# Patient Record
Sex: FEMALE | Race: WHITE | Employment: FULL TIME | URBAN - METROPOLITAN AREA
[De-identification: names, ages, dates, MRNs, and addresses within clinical notes are randomized per-mention and may not be internally consistent; named-entity substitution may affect disease eponyms.]

---

## 2018-05-18 ENCOUNTER — TRANSFERRED RECORDS (OUTPATIENT)
Dept: HEALTH INFORMATION MANAGEMENT | Facility: CLINIC | Age: 63
End: 2018-05-18

## 2018-07-19 ENCOUNTER — TRANSFERRED RECORDS (OUTPATIENT)
Dept: HEALTH INFORMATION MANAGEMENT | Facility: CLINIC | Age: 63
End: 2018-07-19

## 2018-07-25 ENCOUNTER — TRANSFERRED RECORDS (OUTPATIENT)
Dept: HEALTH INFORMATION MANAGEMENT | Facility: CLINIC | Age: 63
End: 2018-07-25

## 2018-07-26 ENCOUNTER — MEDICAL CORRESPONDENCE (OUTPATIENT)
Dept: HEALTH INFORMATION MANAGEMENT | Facility: CLINIC | Age: 63
End: 2018-07-26

## 2018-07-26 ENCOUNTER — PRE VISIT (OUTPATIENT)
Dept: ONCOLOGY | Facility: CLINIC | Age: 63
End: 2018-07-26

## 2018-07-26 NOTE — TELEPHONE ENCOUNTER
Date of appointment: 8/27/18    Diagnosis/reason for appointment: 2nd opinion, family history of Ovarian CA, previous endometriosis.  Referring provider/facility: Dr. Lauren Ribeiro/[facility]  Who called: Patient    Recent Studies   Imaging:  Pathology:  Labs:  Previous chemo/radiation (if known):    Records requested/received from: [facility]    Additional information:

## 2018-07-30 PROCEDURE — 00000346 ZZHCL STATISTIC REVIEW OUTSIDE SLIDES TC 88321: Performed by: OBSTETRICS & GYNECOLOGY

## 2018-07-30 NOTE — TELEPHONE ENCOUNTER
7/30/18 Received slides and 1 pathology report from [facility] and sent to [facility] path dept.

## 2018-07-31 ENCOUNTER — TRANSFERRED RECORDS (OUTPATIENT)
Dept: HEALTH INFORMATION MANAGEMENT | Facility: CLINIC | Age: 63
End: 2018-07-31

## 2018-08-01 LAB — COPATH REPORT: NORMAL

## 2018-08-02 NOTE — TELEPHONE ENCOUNTER
8/2/18 Received records from [facility] (8 pages and 23 pages) and sent to scanning via [employer] @ 1:54 and 1:56 PM

## 2018-08-27 ENCOUNTER — ONCOLOGY VISIT (OUTPATIENT)
Dept: ONCOLOGY | Facility: CLINIC | Age: 63
End: 2018-08-27
Attending: OBSTETRICS & GYNECOLOGY
Payer: COMMERCIAL

## 2018-08-27 ENCOUNTER — RADIANT APPOINTMENT (OUTPATIENT)
Dept: CT IMAGING | Facility: CLINIC | Age: 63
End: 2018-08-27
Attending: OBSTETRICS & GYNECOLOGY
Payer: COMMERCIAL

## 2018-08-27 VITALS
SYSTOLIC BLOOD PRESSURE: 164 MMHG | DIASTOLIC BLOOD PRESSURE: 85 MMHG | WEIGHT: 102.7 LBS | HEART RATE: 67 BPM | OXYGEN SATURATION: 100 % | RESPIRATION RATE: 16 BRPM | HEIGHT: 60 IN | BODY MASS INDEX: 20.16 KG/M2

## 2018-08-27 DIAGNOSIS — Z80.41 FAMILY HISTORY OF MALIGNANT NEOPLASM OF OVARY: ICD-10-CM

## 2018-08-27 DIAGNOSIS — N80.9 ENDOMETRIOSIS: ICD-10-CM

## 2018-08-27 DIAGNOSIS — N80.9 ENDOMETRIOSIS: Primary | ICD-10-CM

## 2018-08-27 PROCEDURE — 99205 OFFICE O/P NEW HI 60 MIN: CPT | Mod: ZP | Performed by: OBSTETRICS & GYNECOLOGY

## 2018-08-27 PROCEDURE — G0463 HOSPITAL OUTPT CLINIC VISIT: HCPCS | Mod: ZF

## 2018-08-27 RX ORDER — IOPAMIDOL 755 MG/ML
62 INJECTION, SOLUTION INTRAVASCULAR ONCE
Status: COMPLETED | OUTPATIENT
Start: 2018-08-27 | End: 2018-08-27

## 2018-08-27 RX ORDER — LISINOPRIL 20 MG/1
20 TABLET ORAL EVERY MORNING
COMMUNITY
Start: 2018-06-06

## 2018-08-27 RX ORDER — CEPHRADINE 500 MG
200 CAPSULE ORAL EVERY MORNING
COMMUNITY

## 2018-08-27 RX ADMIN — IOPAMIDOL 62 ML: 755 INJECTION, SOLUTION INTRAVASCULAR at 12:15

## 2018-08-27 ASSESSMENT — PAIN SCALES - GENERAL: PAINLEVEL: NO PAIN (0)

## 2018-08-27 NOTE — LETTER
2018       RE: Dayana Luna  [address] [place] [address]     Dear Colleague,    Thank you for referring your patient, Dayana Luna, to the [facility]ER [facility]. Please see a copy of my visit note below.                            Consult Notes on Referred Patient    Date: 2018       Dr. Lauren Ribeiro, DO  [facility]  [address]  [place] [address]       RE: Dayana Luna  : 1955  TAHIR: 2018    Dear Dr. Lauren Ribeiro:    I had the pleasure of seeing your patient Dayana Luna here at the Gynecologic Cancer Clinic at the [facility] on 2018.  As you know she is a very pleasant 63 year old woman with a recent diagnosis of family history of ovarian cancer.  Given these findings she was subsequently sent to the Gynecologic Cancer Clinic for new patient consultation.   G1, P1, one prior  section.  She went through menopause at age 48.  She has never been on hormone replacement therapy.  No vaginal bleeding.  Both her sisters had been diagnosed with clear cell ovarian cancer, one sister about 2 years ago with a stage IC clear cell carcinoma of the ovaries and undergone adjuvant chemotherapy.  The second sister has been treated here by us with stage IIB clear cell carcinoma of the ovary.  She has undergone, also, adjuvant chemotherapy.  Both also had endometriosis.  Because of that, the patient has recently undergone an attempted laparoscopic hysterectomy due to adhesions.  Only a right salpingo-oophorectomy was performed.  She did have significant endometriosis.  The right tube and ovary were benign.   preoperatively was 8.  There was no ultrasound preoperatively given the patient's discomfort from transvaginal ultrasound.  She now has recovered well from surgery.  She has no nausea or vomiting, fever or chills.  Normal urinary and bowel function.  No vaginal bleeding.  No B symptoms.             Past Medical  "History:  Hypertension.           Past Surgical History:  1.  Low transverse  section.   2.  Laparoscopy for endometriosis ablation.   3.  Laparoscopic right salpingo-oophorectomy.           Health Maintenance:  Health Maintenance Due   Topic Date Due     PHQ-2 Q1 YR  1965     TETANUS IMMUNIZATION (SYSTEM ASSIGNED)  1971     HIV SCREEN (SYSTEM ASSIGNED)  1971     HEPATITIS C SCREENING  1971     PAP SCREENING Q3 YR (SYSTEM ASSIGNED)  1974     LIPID SCREEN Q5 YR FEMALE (SYSTEM ASSIGNED)  1998     MAMMO SCREEN Q2 YR (SYSTEM ASSIGNED)  2003     COLON CANCER SCREEN (SYSTEM ASSIGNED)  2003     ADVANCE DIRECTIVE PLANNING Q5 YRS  2008     PNEUMOCOCCAL (1 of 2 - PCV13) 2018     FALL RISK ASSESSMENT  2018     DEXA SCAN SCREENING (SYSTEM ASSIGNED)  2018     No history of abnormal Pap smears.             Current Medications:     has a current medication list which includes the following prescription(s): iohexol, alpha-lipoic acid, aspirin, lisinopril, and ubiquinol.       Allergies:     [unfilled]        Social History:     Social History   Substance Use Topics     Smoking status: Never Smoker     Smokeless tobacco: Never Used     Alcohol use Yes       History   Drug Use No           Family History:   Clear cell carcinoma of 2 sisters in their 60s of the ovary.           Physical Exam:     /85  Pulse 67  Resp 16  Ht 1.53 m (5' 0.24\")  Wt 46.6 kg (102 lb 11.2 oz)  LMP   SpO2 100%  BMI 19.9 kg/m2  Body mass index is 19.9 kg/(m^2).    General Appearance: healthy and alert, no distress     Musculoskeletal: extremities non tender and without edema    Neurological: normal gait, no gross defects     Psychiatric: appropriate mood and affect                               ABDOMEN:  Soft, nontender, nondistended, no organomegaly.   PELVIC:  Normal external genitalia.  Normal vaginal mucosa.  Normal-appearing cervix.  No adnexal masses or " tenderness.  Rectovaginal confirms.             Assessment:    Dayana Luna is a 63 year old woman with a new diagnosis of family history of ovarian cancer.     A total of 60 minutes was spent with the patient, 40 minutes of which were spent in counseling the patient and/or treatment planning.    1.  Family history of ovarian cancer.   2.  Endometriosis.      Discussed with the patient we will obtain CT of the chest, abdomen and pelvis to evaluate for any anatomic abnormalities.  She had a preoperative CA-125.  It is normal.  I will not repeat this again, as she had recent surgery.  I will see her back after that and will also proceed with at least a left salpingo-oophorectomy.  Given the significance of disease she has, she might even need a hysterectomy at that point, depending on the findings of the CT.  We will have her see my colleagues in Anesthesia at the same time for preoperative clearance.  They do agree with this plan, are very appreciative of her care.  We did discuss that if there is a cancer that typically will run in families.  Sister was tested with 25-gene panel which was negative for germline.  Other sister is still waiting for her testing.  She is part of [facility] program with testing for several hundred genes.  However, we did discuss even if those are negative, which most likely they will be, she is at significant risk just given her family history and the fact that she has endometriosis, which would warrant a prophylactic left salpingo-oophorectomy plus/minus hysterectomy regardless.  Agrees with this plan, is very appreciative of her care.  All questions were answered.             Thank you for allowing us to participate in the care of your patient.         Sincerely,    Jose Hassan MD, MS    Department of Obstetrics and Gynecology   Division of Gynecologic Oncology   [facility]  Phone: [phone]      CC  Patient Care Team:  Yogesh  Eva MIRAMONTES as PCP - General (Family Practice)  Lauren Ribeiro DO as Referring Physician

## 2018-08-27 NOTE — MR AVS SNAPSHOT
"              After Visit Summary   8/27/2018    Dayana Luna    MRN: [id number]           Patient Information     Date Of Birth          1955        Visit Information        Provider Department      8/27/2018 9:00 AM Jose Hassan MD [facility]        Today's Diagnoses     Endometriosis    -  1    Family history of malignant neoplasm of ovary           Follow-ups after your visit        Who to contact     If you have questions or need follow up information about today's clinic visit or your schedule please contact [facility] directly at [phone].  Normal or non-critical lab and imaging results will be communicated to you by MyChart, letter or phone within 4 business days after the clinic has received the results. If you do not hear from us within 7 days, please contact the clinic through MyChart or phone. If you have a critical or abnormal lab result, we will notify you by phone as soon as possible.  Submit refill requests through [employer] or call your pharmacy and they will forward the refill request to us. Please allow 3 business days for your refill to be completed.          Additional Information About Your Visit        Care EveryWhere ID     This is your Care EveryWhere ID. This could be used by other organizations to access your [facility] medical records  [id number]        Your Vitals Were     Pulse Respirations Height Pulse Oximetry BMI (Body Mass Index)       67 16 1.53 m (5' 0.24\") 100% 19.9 kg/m2        Blood Pressure from Last 3 Encounters:   08/27/18 164/85    Weight from Last 3 Encounters:   08/27/18 46.6 kg (102 lb 11.2 oz)              Today, you had the following     No orders found for display         Today's Medication Changes          These changes are accurate as of 8/27/18 11:59 PM.  If you have any questions, ask your nurse or doctor.               Start taking these medicines.        Dose/Directions    iohexol 140 MG/ML Soln solution "   Commonly known as:  OMNIPAQUE   Used for:  Endometriosis, Family history of malignant neoplasm of ovary   Started by:  Jose Hassan MD        Mix entire bottle (50ml) of contast with 600ml (20 ounces) of water and drink half 2 hrs prior to CT scan and half 1 hr prior to scan   Quantity:  50 mL   Refills:  0            Where to get your medicines      These medications were sent to [facility] [address]  [address] [address]    Hours:  TRANSPLANT PHONE NUMBER [phone] Phone:  [phone]     iohexol 140 MG/ML Soln solution                Primary Care Provider Office Phone # Fax #    Eva Zuñiga [phone]       [facility] [address] [address]        Equal Access to Services     FLORENTINO SAL : Hadii hsannan richardson hadasho Soomaali, waaxda luqadaha, qaybta kaalmada adeegyada, alvarez jones haycatherine moreno . So [facility] [phone].    ATENCIÓN: Si habla español, tiene a berger disposición servicios gratuitos de asistencia lingüística. Llame al [phone].    We comply with applicable federal civil rights laws and [place] laws. We do not discriminate on the basis of race, color, national origin, age, disability, sex, sexual orientation, or gender identity.            Thank you!     Thank you for choosing [facility]CER CLINIC  for your care. Our goal is always to provide you with excellent care. Hearing back from our patients is one way we can continue to improve our services. Please take a few minutes to complete the written survey that you may receive in the mail after your visit with us. Thank you!             Your Updated Medication List - Protect others around you: Learn how to safely use, store and throw away your medicines at www.disposemymeds.org.          This list is accurate as of 8/27/18 11:59 PM.  Always use your most recent med list.                    Brand Name Dispense Instructions for use Diagnosis    Alpha-Lipoic Acid 200 MG Caps      Take 200 mg by mouth        ASPIRIN 81 PO      Take 81 mg by mouth        iohexol 140 MG/ML Soln solution    OMNIPAQUE    50 mL    Mix entire bottle (50ml) of contast with 600ml (20 ounces) of water and drink half 2 hrs prior to CT scan and half 1 hr prior to scan    Endometriosis, Family history of malignant neoplasm of ovary       lisinopril 20 MG tablet    PRINIVIL/ZESTRIL          Ubiquinol 200 MG Caps      Take 1 capsule by mouth

## 2018-08-27 NOTE — DISCHARGE INSTRUCTIONS

## 2018-09-11 DIAGNOSIS — N83.209 OVARIAN CYST: Primary | ICD-10-CM

## 2018-09-11 DIAGNOSIS — R93.5 ABNORMAL CT SCAN, PELVIS: ICD-10-CM

## 2018-09-13 ENCOUNTER — CARE COORDINATION (OUTPATIENT)
Dept: ONCOLOGY | Facility: CLINIC | Age: 63
End: 2018-09-13

## 2018-09-13 NOTE — PROGRESS NOTES
"Patient reached out to RN to discuss result and plan.     Patient also wanted to update RN that she was diagnosed with a inner ear infection and was placed on a \"steroid pack\".     Patient agrees to the plan discussed and would like to schedule her MRI on her own as to assure a time that works.     Patient appreciative of the RN's time.    Mariah Duval RN    "

## 2018-09-29 ENCOUNTER — RADIANT APPOINTMENT (OUTPATIENT)
Dept: MRI IMAGING | Facility: CLINIC | Age: 63
End: 2018-09-29
Attending: OBSTETRICS & GYNECOLOGY
Payer: COMMERCIAL

## 2018-09-29 DIAGNOSIS — R93.5 ABNORMAL CT SCAN, PELVIS: ICD-10-CM

## 2018-09-29 DIAGNOSIS — N83.209 OVARIAN CYST: ICD-10-CM

## 2018-09-29 RX ORDER — GADOBUTROL 604.72 MG/ML
7.5 INJECTION INTRAVENOUS ONCE
Status: COMPLETED | OUTPATIENT
Start: 2018-09-29 | End: 2018-09-29

## 2018-09-29 RX ADMIN — GADOBUTROL 5 ML: 604.72 INJECTION INTRAVENOUS at 12:44

## 2018-09-29 NOTE — DISCHARGE INSTRUCTIONS
MRI Contrast Discharge Instructions    The IV contrast you received today will pass out of your body in your  urine. This will happen in the next 24 hours. You will not feel this process.  Your urine will not change color.    Drink at least 4 extra glasses of water or juice today (unless your doctor  has restricted your fluids). This reduces the stress on your kidneys.  You may take your regular medicines.    If you are on dialysis: It is best to have dialysis today.    If you have a reaction: Most reactions happen right away. If you have  any new symptoms after leaving the hospital (such as hives or swelling),  call your hospital at the correct number below. Or call your family doctor.  If you have breathing distress or wheezing, call 911.    Special instructions: ***    I have read and understand the above information.    Signature:______________________________________ Date:___________    Staff:__________________________________________ Date:___________     Time:__________    [place] [facility] Departments:    ___[place]: [phone]  ___[facility]: [phone]  ___[place]: [phone] ___[facility]: [phone]  ___[facility]: [phone]  ___[facility]: [phone]  ___[phone]  ___[facility]: [phone]  ___[phone]

## 2018-10-05 DIAGNOSIS — Z01.818 PREOPERATIVE EXAMINATION: Primary | ICD-10-CM

## 2018-10-24 ENCOUNTER — OFFICE VISIT (OUTPATIENT)
Dept: SURGERY | Facility: CLINIC | Age: 63
End: 2018-10-24
Payer: COMMERCIAL

## 2018-10-24 ENCOUNTER — ANESTHESIA EVENT (OUTPATIENT)
Dept: SURGERY | Facility: CLINIC | Age: 63
End: 2018-10-24

## 2018-10-24 ENCOUNTER — ONCOLOGY VISIT (OUTPATIENT)
Dept: ONCOLOGY | Facility: CLINIC | Age: 63
End: 2018-10-24
Attending: OBSTETRICS & GYNECOLOGY
Payer: COMMERCIAL

## 2018-10-24 VITALS
TEMPERATURE: 98 F | DIASTOLIC BLOOD PRESSURE: 80 MMHG | WEIGHT: 101.8 LBS | OXYGEN SATURATION: 99 % | SYSTOLIC BLOOD PRESSURE: 150 MMHG | HEART RATE: 73 BPM | BODY MASS INDEX: 19.73 KG/M2

## 2018-10-24 VITALS
RESPIRATION RATE: 16 BRPM | HEIGHT: 60 IN | WEIGHT: 104.1 LBS | OXYGEN SATURATION: 99 % | SYSTOLIC BLOOD PRESSURE: 170 MMHG | DIASTOLIC BLOOD PRESSURE: 78 MMHG | BODY MASS INDEX: 20.44 KG/M2 | HEART RATE: 61 BPM | TEMPERATURE: 98 F

## 2018-10-24 DIAGNOSIS — Z80.41 FAMILY HISTORY OF MALIGNANT NEOPLASM OF OVARY: ICD-10-CM

## 2018-10-24 DIAGNOSIS — Z01.818 PRE-OP EXAMINATION: Primary | ICD-10-CM

## 2018-10-24 DIAGNOSIS — Z80.41 FAMILY HISTORY OF MALIGNANT NEOPLASM OF OVARY: Primary | ICD-10-CM

## 2018-10-24 DIAGNOSIS — N80.9 ENDOMETRIOSIS: ICD-10-CM

## 2018-10-24 LAB
ANION GAP SERPL CALCULATED.3IONS-SCNC: 5 MMOL/L (ref 3–14)
BUN SERPL-MCNC: 13 MG/DL (ref 7–30)
CALCIUM SERPL-MCNC: 9.3 MG/DL (ref 8.5–10.1)
CHLORIDE SERPL-SCNC: 106 MMOL/L (ref 94–109)
CO2 SERPL-SCNC: 30 MMOL/L (ref 20–32)
CREAT SERPL-MCNC: 0.7 MG/DL (ref 0.52–1.04)
ERYTHROCYTE [DISTWIDTH] IN BLOOD BY AUTOMATED COUNT: 11.7 % (ref 10–15)
GFR SERPL CREATININE-BSD FRML MDRD: 84 ML/MIN/1.7M2
GLUCOSE SERPL-MCNC: 87 MG/DL (ref 70–99)
HCT VFR BLD AUTO: 40.8 % (ref 35–47)
HGB BLD-MCNC: 13.6 G/DL (ref 11.7–15.7)
MCH RBC QN AUTO: 31.1 PG (ref 26.5–33)
MCHC RBC AUTO-ENTMCNC: 33.3 G/DL (ref 31.5–36.5)
MCV RBC AUTO: 93 FL (ref 78–100)
PLATELET # BLD AUTO: 239 10E9/L (ref 150–450)
POTASSIUM SERPL-SCNC: 3.6 MMOL/L (ref 3.4–5.3)
RBC # BLD AUTO: 4.37 10E12/L (ref 3.8–5.2)
SODIUM SERPL-SCNC: 141 MMOL/L (ref 133–144)
WBC # BLD AUTO: 4.2 10E9/L (ref 4–11)

## 2018-10-24 PROCEDURE — 99214 OFFICE O/P EST MOD 30 MIN: CPT | Mod: 57 | Performed by: OBSTETRICS & GYNECOLOGY

## 2018-10-24 PROCEDURE — 86900 BLOOD TYPING SEROLOGIC ABO: CPT

## 2018-10-24 PROCEDURE — G0463 HOSPITAL OUTPT CLINIC VISIT: HCPCS | Mod: ZF

## 2018-10-24 PROCEDURE — 86901 BLOOD TYPING SEROLOGIC RH(D): CPT

## 2018-10-24 PROCEDURE — 86850 RBC ANTIBODY SCREEN: CPT

## 2018-10-24 RX ORDER — PHENAZOPYRIDINE HYDROCHLORIDE 200 MG/1
200 TABLET, FILM COATED ORAL ONCE
Status: CANCELLED | OUTPATIENT
Start: 2018-10-24 | End: 2018-10-24

## 2018-10-24 RX ORDER — CHOLECALCIFEROL (VITAMIN D3) 25 MCG
1 TABLET,CHEWABLE ORAL EVERY MORNING
COMMUNITY

## 2018-10-24 RX ORDER — CEFAZOLIN SODIUM 2 G/50ML
2 SOLUTION INTRAVENOUS
Status: CANCELLED | OUTPATIENT
Start: 2018-10-24

## 2018-10-24 RX ORDER — CEFAZOLIN SODIUM 1 G/50ML
1 INJECTION, SOLUTION INTRAVENOUS SEE ADMIN INSTRUCTIONS
Status: CANCELLED | OUTPATIENT
Start: 2018-10-24

## 2018-10-24 RX ORDER — LEVOTHYROXINE SODIUM 88 UG/1
88 TABLET ORAL AT BEDTIME
COMMUNITY
Start: 2018-08-21

## 2018-10-24 RX ORDER — HEPARIN SODIUM 5000 [USP'U]/.5ML
5000 INJECTION, SOLUTION INTRAVENOUS; SUBCUTANEOUS
Status: CANCELLED | OUTPATIENT
Start: 2018-10-24

## 2018-10-24 ASSESSMENT — PAIN SCALES - GENERAL: PAINLEVEL: NO PAIN (0)

## 2018-10-24 NOTE — MR AVS SNAPSHOT
After Visit Summary   10/24/2018    Dayana Luna    MRN: [id number]           Patient Information     Date Of Birth          1955        Visit Information        Provider Department      10/24/2018 12:30 PM Maureen Jorge, APRN CNP [facility]operative [facility]        Care Instructions    Preparing for Your Surgery      Name:  Dayana Luna   MRN:  [id number]   :  1955   Today's Date:  10/24/2018     Arriving for surgery:  Surgery date:  Surgery has not yet been scheduled.  An RN from the Pre Admission Nursing Department                              Will call you 1-2 days before your procedure to confirm arrival time, location and fasting instructions   Arrival time:    Please come to:     To Be Determined     What can I eat or drink?  -  You may have solid food or milk products until 8 hours prior to your surgery.  -  You may have water, apple juice or 7up/Sprite until 2 hours prior to your surgery.    Which medicines can I take?        Stop Aspirin, vitamins and supplements one week prior to surgery.      Hold Ibuprofen and Naproxen for 24 hours prior to surgery.     -  Do NOT take these medications in the morning, the day of surgery:     Lisinopril       -  Please take these medications the day of surgery:     Levothyroxine       How do I prepare myself?  -  Take two showers: one the night before surgery; and one the morning of surgery.         Use Scrubcare or Hibiclens to wash from neck down.  You may use your own     shampoo and conditioner. No other hair products.   -  Do NOT use lotion, powder, deodorant, or antiperspirant the day of your surgery.  -  Do NOT wear any makeup, fingernail polish or jewelry  - Do not bring your own medications to the hospital, except for inhalers and eye   drops.  -  Bring your ID and insurance card.    Questions or Concerns:  -If you have questions or concerns regarding the day of surgery, please call [phone].     -If you are  scheduled at the Ambulatory Surgery Center please call [phone].    -For questions after surgery please call your surgeons office.                     Follow-ups after your visit        Your next 10 appointments already scheduled     Oct 24, 2018 12:30 PM CDT   (Arrive by 12:15 PM)   PAC EVALUATION with CAROLINE Daniel CNP   [facility]operative [facility] ([facility])    [address]  [address]  [facility] [address] [phone]              Future tests that were ordered for you today     Open Future Orders        Priority Expected Expires Ordered    ABO/Rh Type and Screen Routine  10/24/2019 10/24/2018            Who to contact     Please call your clinic at [phone] to:    Ask questions about your health    Make or cancel appointments    Discuss your medicines    Learn about your test results    Speak to your doctor            Additional Information About Your Visit        Care EveryWhere ID     This is your Care EveryWhere ID. This could be used by other organizations to access your [facility] medical records  [id number]         Blood Pressure from Last 3 Encounters:   10/24/18 150/80   08/27/18 164/85    Weight from Last 3 Encounters:   10/24/18 46.2 kg (101 lb 12.8 oz)   08/27/18 46.6 kg (102 lb 11.2 oz)              Today, you had the following     No orders found for display       Primary Care Provider Office Phone # Fax #    Eva Zuñiga [phone]       [facility] [address] [address]        Equal Access to Services     FLORENTINO SAL AH: Hadii shannan zavalao Soelizabeth, waaxda luqadaha, qaybta kaalmada pedro luisegyada, alvarez kahn. So [facility] [phone].    ATENCIÓN: Si habla español, tiene a berger disposición servicios gratuitos de asistencia lingüística. Llame al [phone].    We comply with applicable federal civil rights laws and [place] laws. We do not discriminate  on the basis of race, color, national origin, age, disability, sex, sexual orientation, or gender identity.            Thank you!     Thank you for choosing [facility] PREOPERATIVE ASSESSMENT CENTER  for your care. Our goal is always to provide you with excellent care. Hearing back from our patients is one way we can continue to improve our services. Please take a few minutes to complete the written survey that you may receive in the mail after your visit with us. Thank you!             Your Updated Medication List - Protect others around you: Learn how to safely use, store and throw away your medicines at www.disposemymeds.org.          This list is accurate as of 10/24/18 12:22 PM.  Always use your most recent med list.                   Brand Name Dispense Instructions for use Diagnosis    Alpha-Lipoic Acid 200 MG Caps      Take 200 mg by mouth        ASPIRIN 81 PO      Take 81 mg by mouth        FISH OIL PO      Take 1,400 mg by mouth        levothyroxine 88 MCG tablet    SYNTHROID/LEVOTHROID     Take 88 mcg by mouth        lisinopril 20 MG tablet    PRINIVIL/ZESTRIL          Lysine 500 MG Caps      Take 1,000 mg by mouth        OCUVITE ADULT 50+ PO      Take 1 tablet by mouth        STRESS B-COMPLEX/VIT C/ZINC Tabs      Take 1 tablet by mouth        Ubiquinol 200 MG Caps      Take 1 capsule by mouth

## 2018-10-24 NOTE — NURSING NOTE
"Oncology Rooming Note    October 24, 2018 10:45 AM   Dayana Luna is a 63 year old female who presents for:    Chief Complaint   Patient presents with     Oncology Clinic Visit     Return; Family Hx Ovarian CA and Prev Dx of Endometriosis     Initial Vitals: /80  Pulse 73  Temp 98  F (36.7  C) (Oral)  Wt 46.2 kg (101 lb 12.8 oz)  SpO2 99%  BMI 19.73 kg/m2 Estimated body mass index is 19.73 kg/(m^2) as calculated from the following:    Height as of 8/27/18: 1.53 m (5' 0.24\").    Weight as of this encounter: 46.2 kg (101 lb 12.8 oz). Body surface area is 1.4 meters squared.  No Pain (0) Comment: Data Unavailable   No LMP recorded. Patient has had a hysterectomy.  Allergies reviewed: Yes  Medications reviewed: Yes    Medications: Medication refills not needed today.  Pharmacy name entered into EPIC: Data Unavailable    Clinical concerns: Here to discuss surgery options. Sonya was notified.    7 minutes for nursing intake (face to face time)     Trish Bess MA              "

## 2018-10-24 NOTE — LETTER
10/24/2018       RE: Dayana Luna  [address] [place] [address]     Dear Colleague,    Thank you for referring your patient, Dayana Luna, to the [facility] CANCER CLINIC. Please see a copy of my visit note below.                Follow Up Notes on Referred Patient    Date: 10/24/2018       Dr. Lauren Ribeiro, DO  [facility]  [address]  [place] [address]       RE: Dayana Luna  : 1955  TAHIR: 10/24/2018    Dear Dr. Lauren Ribeiro:    Dayana Luna is a 63 year old woman with a diagnosis of family history of ovarian cancer.           Patient presents for followup.  No new symptoms since the last time I have seen her.           Past Medical History:    Past Medical History:   Diagnosis Date     Endometriosis      Hypertension      Hypothyroid      Labyrinthitis, acute      Osteopenia          Past Surgical History:    Past Surgical History:   Procedure Laterality Date     SALPINGO OOPHORECTOMY,R/L/MANGO Right 2018         Health Maintenance Due   Topic Date Due     PHQ-2 Q1 YR  1965     TETANUS IMMUNIZATION (SYSTEM ASSIGNED)  1971     HIV SCREEN (SYSTEM ASSIGNED)  1971     HEPATITIS C SCREENING  1971     LIPID SCREEN Q5 YR FEMALE (SYSTEM ASSIGNED)  1998     MAMMO SCREEN Q2 YR (SYSTEM ASSIGNED)  2003     COLON CANCER SCREEN (SYSTEM ASSIGNED)  2003     ADVANCE DIRECTIVE PLANNING Q5 YRS  2008     DEXA SCAN SCREENING (SYSTEM ASSIGNED)  2018       Current Medications:     Current Outpatient Prescriptions   Medication Sig Dispense Refill     Alpha-Lipoic Acid 200 MG CAPS Take 200 mg by mouth every morning        B Complex-C-E-Zn (STRESS B-COMPLEX/VIT C/ZINC) TABS Take 1 tablet by mouth every morning        levothyroxine (SYNTHROID/LEVOTHROID) 88 MCG tablet Take 88 mcg by mouth At Bedtime        lisinopril (PRINIVIL/ZESTRIL) 20 MG tablet Take 20 mg by mouth every morning        Lysine 500 MG CAPS Take 500  mg by mouth every morning        Multiple Vitamins-Minerals (OCUVITE ADULT 50+ PO) Take 1 tablet by mouth every morning        Omega-3 Fatty Acids (FISH OIL PO) Take 1,400 mg by mouth every morning        Ubiquinol 200 MG CAPS Take 1 capsule by mouth every morning        ASPIRIN 81 PO Take 81 mg by mouth every morning ON HOLD FOR SURGERY SINCE 09/24/2018       Calcium Carb-Cholecalciferol (CALCIUM 600 + D PO) Take 600 mg by mouth 2 times daily           Allergies:        Allergies   Allergen Reactions     Codeine Hives     Polymyxin B-Trimethoprim Itching and Swelling        Social History:     Social History   Substance Use Topics     Smoking status: Never Smoker     Smokeless tobacco: Never Used     Alcohol use Yes       History   Drug Use No         Family History:   Patient's sister has had a stage I clear cell carcinoma of the ovary.           Physical Exam:     /80  Pulse 73  Temp 98  F (36.7  C) (Oral)  Wt 46.2 kg (101 lb 12.8 oz)  SpO2 99%  BMI 19.73 kg/m2  Body mass index is 19.73 kg/(m^2).    General Appearance: healthy and alert, no distress     Neurological: normal gait, no gross defects     Psychiatric: appropriate mood and affect                                     Assessment:    Dayana Luna is a 63 year old woman with a diagnosis of family history of ovarian cancer.     A total of 25 minutes was spent with the patient, 20 minutes of which were spent in counseling the patient and/or treatment planning.      1.  History of ovarian cancer.   2.  Prior history of endometriosis.   3.  Normal CA-125.      Reviewed with the patient as well as with her family the scans.  There is no evidence of disease.  We will proceed with a risk-reducing removal of her remaining ovary and tube.  Depending on intraoperative findings, would also proceed with a hysterectomy to inquire cancer or precancer and then possible debulking as well as staging procedure.  The patient agrees with this plan, is very  appreciative of her care.  We discussed we will try to do this laparoscopically; however, with removal of the other tube and ovary had significant difficulties with adhesions.  If that is a significant issue, we might have to do this with an open approach.  The patient agrees with the plan, is very appreciative of her care.  All questions were answered.  I will have her see my colleagues in Anesthesia for preoperative clearance.  All questions were answered.       Jose Hassan MD, MS    Department of Obstetrics and Gynecology   Division of Gynecologic Oncology   [facility]  Phone: [phone]        CC  Patient Care Team:  Eva Zuñiga as PCP - General (Family Practice)  Lauren Ribeiro DO as Referring Physician

## 2018-10-24 NOTE — ANESTHESIA PREPROCEDURE EVALUATION
Anesthesia Evaluation     . Pt has had prior anesthetic. Type: General and MAC    No history of anesthetic complications          ROS/MED HX    ENT/Pulmonary: Comment: Seen early September in the ED for vertigo.  Found to be an inner abnormality.  Seen by ENT.  Since has cleared up.       Neurologic:  - neg neurologic ROS     Cardiovascular:     (+) hypertension----. : . . . :. . No previous cardiac testing       METS/Exercise Tolerance: Comment: Walks for 10 minutes at everyone of her breaks at work.  Kettle bell workout.  >4 METS   Hematologic:  - neg hematologic  ROS       Musculoskeletal:  - neg musculoskeletal ROS       GI/Hepatic:  - neg GI/hepatic ROS       Renal/Genitourinary:  - ROS Renal section negative       Endo: Comment: Known Hashimoto Syndrome being followed.     (+) thyroid problem hypothyroidism, .      Psychiatric:  - neg psychiatric ROS       Infectious Disease:  - neg infectious disease ROS       Malignancy:      - no malignancy   Other:    (+) No chance of pregnancy C-spine cleared: Yes, no H/O Chronic Pain,no other significant disability                    Physical Exam  Normal systems: cardiovascular, pulmonary and dental    Airway   Mallampati: I  TM distance: >3 FB  Neck ROM: full    Dental   (+) missing    Cardiovascular   Rhythm and rate: regular and normal      Pulmonary    breath sounds clear to auscultation    Other findings:     EKG from OSH 9/6/18:  NSR     For further details of assessment, testing, and physical exam please see H and P completed on same date.           PAC Discussion and Assessment    ASA Classification: 1  Case is suitable for: [place]  Anesthetic techniques and relevant risks discussed:   Invasive monitoring and risk discussed:   Types:   Possibility and Risk of blood transfusion discussed:   NPO instructions given:   Additional anesthetic preparation and risks discussed:   Needs early admission to pre-op area:   Other:     PAC Resident/NP Anesthesia  Assessment:  Dayana Luna is a 63 year old female scheduled for left salpingo-oophorectomy with possible hysterectomy with Dr. Hassan with date, time, and location to be determined.  Ms. Luna saw Dr. Hassan on 8/27/2018 for a h/o endometriosis and a strong family history of ovarian cancer (2 sisters with clear cell ovarian cancer). Ms. Luna is status post laparoscopic right salpingo-oophorectomy as well as status post laparoscopic endometrial ablation.  Through the evaluation and imaging, Dr. Hassan recommended the above surgery.    Ms. Luna presents to PAC with her , Dioni.  She denies any cardiopulmonary history or symptoms.  She remains active easily achieving 4 METS. She denies any issues with anesthesia.  She would like to proceed with above surgical intervention.    She has the following specific operative considerations:   - METS:  >4. RCRI : No serious cardiac risks.  0.4 % risk of major adverse cardiac event.  - ADAM # of risks 2/8 = low risk  - VTE risk:  0.5-3%  - Risk of PONV score = 2-3.  If > 2, anti-emetic intervention recommended.    1.  Cardiology: denies cardiopulmonary history or symptoms.  HTN, hold ACE-I DOS. ASA for primary prevention, hold for 7 days prior to surgery.      2.  Pulmonary - no smoking hx  3.  Endocrine - hypothyroidism, followed by endocrine.  Take levothyroxine as prescribed prior to surgery.        - osteopenia, Calcium ad Vit D replacement.  .   4.  Gyn - endometriosis and strong family history of ovarian cancer (2 sisters with clear cell ovarian cancer)>>>above procedure planned.     - Anesthesia considerations:  Refer to PAC assessment in anesthesia records  - CBC, BMP, T& S today     Arrival time, NPO, shower and medication instructions provided by nursing staff today.  Preparing For Your Surgery handout given.  Patient was discussed with Dr Mcdaniel. I spent 30 minutes face to face with patient assessing, educating, counseling and/or coordinating care and  examining the patient.  Of that 30 minutes, I spent greater than 50% of my time counseling and/or coordinating care.        Reviewed and Signed by PAC Mid-Level Provider/Resident  Mid-Level Provider/Resident: Maureen VELAZQUEZ CNP  Date: 10/24/2018  Time: 13:07    Attending Anesthesiologist Anesthesia Assessment:  63 year old for left SO in management of potential for ovarian cancer. Prior right SO and endometrial ablation. Patient has no significant cardiac or pulmonary disease.    Patient/case discussed with JAMAL/resident; agree with above assessment. No need to see patient. Patient is appropriate for the planned procedure without further work-up or medical management.      Reviewed and Signed by PAC Anesthesiologist  Anesthesiologist: deepali  Date: 10.24/2018  Time:   Pass/Fail: Pass  Disposition:     PAC Pharmacist Assessment:        Pharmacist:   Date:   Time:                           .

## 2018-10-24 NOTE — PROGRESS NOTES
Follow Up Notes on Referred Patient    Date: 10/24/2018       Dr. Lauren Ribeiro, DO  [facility]  [address]  [place] [address]       RE: Dayana Luna  : 1955  TAHIR: 10/24/2018    Dear Dr. Lauren Ribeiro:    Dayana Luna is a 63 year old woman with a diagnosis of family history of ovarian cancer.           Patient presents for followup.  No new symptoms since the last time I have seen her.           Past Medical History:    Past Medical History:   Diagnosis Date     Endometriosis      Hypertension      Hypothyroid      Labyrinthitis, acute      Osteopenia          Past Surgical History:    Past Surgical History:   Procedure Laterality Date     SALPINGO OOPHORECTOMY,R/L/MANGO Right 2018         Health Maintenance Due   Topic Date Due     PHQ-2 Q1 YR  1965     TETANUS IMMUNIZATION (SYSTEM ASSIGNED)  1971     HIV SCREEN (SYSTEM ASSIGNED)  1971     HEPATITIS C SCREENING  1971     LIPID SCREEN Q5 YR FEMALE (SYSTEM ASSIGNED)  1998     MAMMO SCREEN Q2 YR (SYSTEM ASSIGNED)  2003     COLON CANCER SCREEN (SYSTEM ASSIGNED)  2003     ADVANCE DIRECTIVE PLANNING Q5 YRS  2008     DEXA SCAN SCREENING (SYSTEM ASSIGNED)  2018       Current Medications:     Current Outpatient Prescriptions   Medication Sig Dispense Refill     Alpha-Lipoic Acid 200 MG CAPS Take 200 mg by mouth every morning        B Complex-C-E-Zn (STRESS B-COMPLEX/VIT C/ZINC) TABS Take 1 tablet by mouth every morning        levothyroxine (SYNTHROID/LEVOTHROID) 88 MCG tablet Take 88 mcg by mouth At Bedtime        lisinopril (PRINIVIL/ZESTRIL) 20 MG tablet Take 20 mg by mouth every morning        Lysine 500 MG CAPS Take 500 mg by mouth every morning        Multiple Vitamins-Minerals (OCUVITE ADULT 50+ PO) Take 1 tablet by mouth every morning        Omega-3 Fatty Acids (FISH OIL PO) Take 1,400 mg by mouth every morning        Ubiquinol 200 MG CAPS Take 1 capsule  by mouth every morning        ASPIRIN 81 PO Take 81 mg by mouth every morning ON HOLD FOR SURGERY SINCE 09/24/2018       Calcium Carb-Cholecalciferol (CALCIUM 600 + D PO) Take 600 mg by mouth 2 times daily           Allergies:        Allergies   Allergen Reactions     Codeine Hives     Polymyxin B-Trimethoprim Itching and Swelling        Social History:     Social History   Substance Use Topics     Smoking status: Never Smoker     Smokeless tobacco: Never Used     Alcohol use Yes       History   Drug Use No         Family History:   Patient's sister has had a stage I clear cell carcinoma of the ovary.           Physical Exam:     /80  Pulse 73  Temp 98  F (36.7  C) (Oral)  Wt 46.2 kg (101 lb 12.8 oz)  SpO2 99%  BMI 19.73 kg/m2  Body mass index is 19.73 kg/(m^2).    General Appearance: healthy and alert, no distress     Neurological: normal gait, no gross defects     Psychiatric: appropriate mood and affect                                     Assessment:    Dayana Luna is a 63 year old woman with a diagnosis of family history of ovarian cancer.     A total of 25 minutes was spent with the patient, 20 minutes of which were spent in counseling the patient and/or treatment planning.      1.  Family History of ovarian cancer.   2.  Prior history of endometriosis.   3.  Normal CA-125.      Reviewed with the patient as well as with her family the scans.  There is no evidence of disease.  We will proceed with a risk-reducing removal of her remaining ovary and tube.  Depending on intraoperative findings, would also proceed with a hysterectomy if we find cancer or precancer and then possible debulking as well as staging procedure.  The patient agrees with this plan, is very appreciative of her care.  We discussed we will try to do this laparoscopically; however, with removal of the other tube and ovary had significant difficulties with adhesions.  If that is a significant issue, we might have to do this with an  open approach.  The patient agrees with the plan, is very appreciative of her care.  All questions were answered.  I will have her see my colleagues in Anesthesia for preoperative clearance.  All questions were answered.       Jose Hassan MD, MS    Department of Obstetrics and Gynecology   Division of Gynecologic Oncology   [facility]  Phone: [phone]        CC  Patient Care Team:  Eva Zuñiga as PCP - General (Family Practice)  Nacho Ribeiro, DO as Referring Physician  NACHO RIBEIRO

## 2018-10-24 NOTE — MR AVS SNAPSHOT
After Visit Summary   10/24/2018    Dayana Luna    MRN: [id number]           Patient Information     Date Of Birth          1955        Visit Information        Provider Department      10/24/2018 10:20 AM Jose Hassan MD [facility]        Today's Diagnoses     Family history of malignant neoplasm of ovary    -  1       Follow-ups after your visit        Who to contact     If you have questions or need follow up information about today's clinic visit or your schedule please contact [facility] directly at [phone].  Normal or non-critical lab and imaging results will be communicated to you by MyChart, letter or phone within 4 business days after the clinic has received the results. If you do not hear from us within 7 days, please contact the clinic through MyChart or phone. If you have a critical or abnormal lab result, we will notify you by phone as soon as possible.  Submit refill requests through [employer] or call your pharmacy and they will forward the refill request to us. Please allow 3 business days for your refill to be completed.          Additional Information About Your Visit        Care EveryWhere ID     This is your Care EveryWhere ID. This could be used by other organizations to access your [facility] medical records  [id number]        Your Vitals Were     Pulse Temperature Pulse Oximetry BMI (Body Mass Index)          73 98  F (36.7  C) (Oral) 99% 19.73 kg/m2         Blood Pressure from Last 3 Encounters:   10/24/18 170/78   10/24/18 150/80   08/27/18 164/85    Weight from Last 3 Encounters:   10/24/18 47.2 kg (104 lb 1.6 oz)   10/24/18 46.2 kg (101 lb 12.8 oz)   08/27/18 46.6 kg (102 lb 11.2 oz)              We Performed the Following     [place]-Operative Worksheet - Laparoscopic bilateral Salpingo-Oophorectomy, possible open/laparotomy, possible cancer staging        Primary Care Provider Office Phone # Fax #    Eva Zuñiga  [phone]       [facility] [address] [address]        Equal Access to Services     FLORENTINO SAL : Hadii aad ku haddayneyahaira Li, katiaconnor hewittsamha, laura orvillemichelle pedro luisbacilio, waxvivian darrylin hayaawhit cloudsamia paniagua lajaradwhit kahn. So [facility] [phone].    ATENCIÓN: Si habla español, tiene a berger disposición servicios gratuitos de asistencia lingüística. Llame al [phone].    We comply with applicable federal civil rights laws and [place] laws. We do not discriminate on the basis of race, color, national origin, age, disability, sex, sexual orientation, or gender identity.            Thank you!     Thank you for choosing [facility]  for your care. Our goal is always to provide you with excellent care. Hearing back from our patients is one way we can continue to improve our services. Please take a few minutes to complete the written survey that you may receive in the mail after your visit with us. Thank you!             Your Updated Medication List - Protect others around you: Learn how to safely use, store and throw away your medicines at www.disposemymeds.org.          This list is accurate as of 10/24/18 11:59 PM.  Always use your most recent med list.                   Brand Name Dispense Instructions for use Diagnosis    Alpha-Lipoic Acid 200 MG Caps      Take 200 mg by mouth every morning        ASPIRIN 81 PO      Take 81 mg by mouth every morning ON HOLD FOR SURGERY SINCE 09/24/2018        CALCIUM 600 + D PO      Take 600 mg by mouth 2 times daily        FISH OIL PO      Take 1,400 mg by mouth every morning        levothyroxine 88 MCG tablet    SYNTHROID/LEVOTHROID     Take 88 mcg by mouth At Bedtime        lisinopril 20 MG tablet    PRINIVIL/ZESTRIL     Take 20 mg by mouth every morning        Lysine 500 MG Caps      Take 500 mg by mouth every morning        OCUVITE ADULT 50+ PO      Take 1 tablet by mouth every morning        STRESS B-COMPLEX/VIT  C/ZINC Tabs      Take 1 tablet by mouth every morning        Ubiquinol 200 MG Caps      Take 1 capsule by mouth every morning

## 2018-10-24 NOTE — H&P
Pre-Operative H & P     CC:  Preoperative exam to assess for increased cardiopulmonary risk while undergoing surgery and anesthesia.    Date of Encounter: 10/24/2018  Primary Care Physician:  Eva Zuñiga  Reason for visit:   Endometriosis    HPI  Dayana Luna is a 63 year old female who presents for pre-operative H & P in preparation for left salpingo-oophorectomy with possible hysterectomy with Dr. Hassan with date, time, and location to be determined.  Ms. Luna saw Dr. Hassan on 8/27/2018 for a h/o endometriosis and a strong family history of ovarian cancer (2 sisters with clear cell ovarian cancer). Ms. Luna is status post laparoscopic right salpingo-oophorectomy as well as status post laparoscopic endometrial ablation.  Through the evaluation and imaging, Dr. Hassan recommended the above surgery.    Ms. Luna presents to PAC with her , Dioni.  She denies any cardiopulmonary history or symptoms.  She remains active easily achieving 4 METS. She denies any issues with anesthesia.  She would like to proceed with above surgical intervention.     History is obtained from the patient and EMR.     Past Medical History  Past Medical History:   Diagnosis Date     Endometriosis      Hypertension      Hypothyroid      Labyrinthitis, acute      Osteopenia        Past Surgical History  Past Surgical History:   Procedure Laterality Date     SALPINGO OOPHORECTOMY,R/L/MANGO Right 07/24/2018       Hx of Blood transfusions/reactions: denies     Hx of abnormal bleeding or anti-platelet use: ASA    Menstrual history: No LMP recorded. Patient has had a hysterectomy.:    Steroid use in the last year: denies    Personal or FH with difficulty with Anesthesia:  denies    Prior to Admission Medications  Current Outpatient Prescriptions   Medication Sig Dispense Refill     Alpha-Lipoic Acid 200 MG CAPS Take 200 mg by mouth every morning        ASPIRIN 81 PO Take 81 mg by mouth every morning ON HOLD FOR SURGERY  SINCE 09/24/2018       B Complex-C-E-Zn (STRESS B-COMPLEX/VIT C/ZINC) TABS Take 1 tablet by mouth every morning        Calcium Carb-Cholecalciferol (CALCIUM 600 + D PO) Take 600 mg by mouth 2 times daily       levothyroxine (SYNTHROID/LEVOTHROID) 88 MCG tablet Take 88 mcg by mouth At Bedtime        lisinopril (PRINIVIL/ZESTRIL) 20 MG tablet Take 20 mg by mouth every morning        Lysine 500 MG CAPS Take 500 mg by mouth every morning        Multiple Vitamins-Minerals (OCUVITE ADULT 50+ PO) Take 1 tablet by mouth every morning        Omega-3 Fatty Acids (FISH OIL PO) Take 1,400 mg by mouth every morning        Ubiquinol 200 MG CAPS Take 1 capsule by mouth every morning          Allergies  Allergies   Allergen Reactions     Codeine Hives     Polymyxin B-Trimethoprim Itching and Swelling       Social History  Social History     Social History     Marital status:      Spouse name: Dioni     Number of children: 1     Years of education: N/A     Occupational History     Not on file.     Social History Main Topics     Smoking status: Never Smoker     Smokeless tobacco: Never Used     Alcohol use Yes     Drug use: No     Sexual activity: Not on file     Other Topics Concern     Not on file     Social History Narrative     to Dioni.  Works full time.  Lives in own home.  Remains active.  Adult daughter with granddaughter and grandson.        Family History  History reviewed. No pertinent family history.    ROS/MED HX    ENT/Pulmonary: Comment: Seen early September in the ED for vertigo.  Found to be an inner abnormality.  Seen by ENT.  Since has cleared up.       Neurologic:  - neg neurologic ROS     Cardiovascular:     (+) hypertension----. : . . . :. . No previous cardiac testing       METS/Exercise Tolerance: Comment: Walks for 10 minutes at everyone of her breaks at work.  Kettle bell workout.  >4 METS   Hematologic:  - neg hematologic  ROS       Musculoskeletal:  - neg musculoskeletal ROS       GI/Hepatic:  " - neg GI/hepatic ROS       Renal/Genitourinary:  - ROS Renal section negative       Endo: Comment: Known Hashimoto Syndrome being followed.     (+) thyroid problem hypothyroidism, .      Psychiatric:  - neg psychiatric ROS       Infectious Disease:  - neg infectious disease ROS       Malignancy:      - no malignancy   Other:    (+) No chance of pregnancy C-spine cleared: Yes, no H/O Chronic Pain,no other significant disability          Temp: 98  F (36.7  C) Temp src: Oral BP: 170/78 Pulse: 61   Resp: 16 SpO2: 99 %         104 lbs 1.6 oz  5' 0\"   Body mass index is 20.33 kg/(m^2).       Physical Exam  Constitutional: Awake, alert, cooperative, no apparent distress, and appears stated age.  Eyes: Pupils equal, round and reactive to light, extra ocular muscles intact, sclera clear, conjunctiva normal.  HENT: Normocephalic, oral pharynx with moist mucus membranes, dentition fair repair. No goiter appreciated.   Respiratory: Clear to auscultation bilaterally, no crackles or wheezing.  Cardiovascular: Regular rate and rhythm, normal S1 and S2, and no murmur noted.  Carotids +2, no bruits. No edema. Palpable pulses to radial  DP and PT arteries.   GI: Normal bowel sounds, soft, non-distended, non-tender, no masses palpated, no hepatosplenomegaly.  Surgical scars: well healed laparoscopic incision sites from this summer's surgery.   Lymph/Hematologic: No cervical lymphadenopathy and no supraclavicular lymphadenopathy.  Genitourinary:  deferred  Skin: Warm and dry.  No rashes at anticipated surgical site.   Musculoskeletal: Full ROM of neck. There is no redness, warmth, or swelling of the joints. Gross motor strength is normal.    Neurologic: Awake, alert, oriented to name, place and time. Cranial nerves II-XII are grossly intact. Gait is normal.   Neuropsychiatric: Calm, cooperative. Normal affect.     Labs: (personally reviewed)  Lab Results   Component Value Date    WBC 4.2 10/24/2018     Lab Results   Component Value " Date    RBC 4.37 10/24/2018     Lab Results   Component Value Date    HGB 13.6 10/24/2018     Lab Results   Component Value Date    HCT 40.8 10/24/2018     Lab Results   Component Value Date    MCV 93 10/24/2018     Lab Results   Component Value Date    MCH 31.1 10/24/2018     Lab Results   Component Value Date    MCHC 33.3 10/24/2018     Lab Results   Component Value Date    RDW 11.7 10/24/2018     Lab Results   Component Value Date     10/24/2018       Last Comprehensive Metabolic Panel:  Sodium   Date Value Ref Range Status   10/24/2018 141 133 - 144 mmol/L Final     Potassium   Date Value Ref Range Status   10/24/2018 3.6 3.4 - 5.3 mmol/L Final     Chloride   Date Value Ref Range Status   10/24/2018 106 94 - 109 mmol/L Final     Carbon Dioxide   Date Value Ref Range Status   10/24/2018 30 20 - 32 mmol/L Final     Anion Gap   Date Value Ref Range Status   10/24/2018 5 3 - 14 mmol/L Final     Glucose   Date Value Ref Range Status   10/24/2018 87 70 - 99 mg/dL Final     Urea Nitrogen   Date Value Ref Range Status   10/24/2018 13 7 - 30 mg/dL Final     Creatinine   Date Value Ref Range Status   10/24/2018 0.70 0.52 - 1.04 mg/dL Final     GFR Estimate   Date Value Ref Range Status   10/24/2018 84 >60 mL/min/1.7m2 Final     Comment:     Non  GFR Calc     Calcium   Date Value Ref Range Status   10/24/2018 9.3 8.5 - 10.1 mg/dL Final         Procedure  EKG from OSH 9/6/18:  NSR   Outside records reviewed from: Care Everywhere    ASSESSMENT and PLAN  Dayana Luna is a 63 year old female scheduled to undergo  left salpingo-oophorectomy with possible hysterectomy with Dr. Hassan with date, time, and location to be determined.      She has the following specific operative considerations:   - METS:  >4. RCRI : No serious cardiac risks.  0.4 % risk of major adverse cardiac event.  - ADAM # of risks 2/8 = low risk  - VTE risk:  0.5-3%  - Risk of PONV score = 2-3.  If > 2, anti-emetic intervention  recommended.    1.  Cardiology: denies cardiopulmonary history or symptoms.  HTN, hold ACE-I DOS. ASA for primary prevention, hold for 7 days prior to surgery.      2.  Pulmonary - no smoking hx  3.  Endocrine - hypothyroidism, followed by endocrine.  Take levothyroxine as prescribed prior to surgery.        - osteopenia, Calcium ad Vit D replacement.  .   4.  Gyn - endometriosis and strong family history of ovarian cancer (2 sisters with clear cell ovarian cancer)>>>above procedure planned.     - Anesthesia considerations:  Refer to PAC assessment in anesthesia records  - CBC, BMP, T& S today     Arrival time, NPO, shower and medication instructions provided by nursing staff today.  Preparing For Your Surgery handout given.  Patient was discussed with Dr Mcdaniel. I spent 30 minutes face to face with patient assessing, educating, counseling and/or coordinating care and examining the patient.  Of that 30 minutes, I spent greater than 50% of my time counseling and/or coordinating care.        CAROLINE Walker CNP  Preoperative Assessment Center  [facility]  Clinic and Surgery Center  Phone: [phone]  Fax: [phone]

## 2018-10-24 NOTE — NURSING NOTE
Pre Op Nurse Teaching Template    Relevant Diagnosis: Family history of ovarian cancer     Teaching Topic: Laparoscopic left salpingo oophorectomy,  Possible open, possible hysterectomy, possible cancer staging, possible debulking     Person(s) involved in teaching :  Patient  & spouse  Motivation Level:  Asks Questions:    Yes      Eager to Learn:     Yes     Cooperative:          Yes    Receptive (willing. Able to accept information):    Yes      Patient and those who are listed above demonstrates understanding of the following:   Reason for the appointment, diagnosis and treatment plan:   Yes   Knowledge of proper use of medications and conditions for which they are ordered (with special attention to potential side effects or drug interactions): Yes   Which situations necessitate calling provider and whom to contact: Yes         Nutritional needs and diet plan:  Yes      Pain management techniques:     Yes, Pain Scale   Diet:   Yes, Health system Diet Instructions    Teaching Concerns addressed: Yes    Infection Prevention:  Patient and those who are listed above demonstrate understanding of the following:  Pre-Op CHG Bathing Instructions: Yes  Surgical procedure site care taught:   Yes   Signs and symptoms of infection taught: Yes       Instructional Materials Used/Given:  The [facility] Before You Surgery Booklet  Showering or Bathing before Surgery Instructions & CHG Product  Hysterectomy Guidelines  Pain Assessment Tool   Home Care after Major Abdominal or Vaginal Surgery  Map  Accommodations Brochure  Phone numbers for Health system and Station 7C  Copy of Surgical Consent    Comments:  ARIELA Duval RN

## 2018-10-24 NOTE — PATIENT INSTRUCTIONS
Preparing for Your Surgery      Name:  Dayana Luna   MRN:  [id number]   :  1955   Today's Date:  10/24/2018     Arriving for surgery:  Surgery date:  Surgery has not yet been scheduled.  An RN from the Pre Admission Nursing Department                              Will call you 1-2 days before your procedure to confirm arrival time, location and fasting instructions   Arrival time:    Please come to:     To Be Determined     What can I eat or drink?  -  You may have solid food or milk products until 8 hours prior to your surgery.  -  You may have water, apple juice or 7up/Sprite until 2 hours prior to your surgery.    Which medicines can I take?        Stop Aspirin, vitamins and supplements one week prior to surgery.      Hold Ibuprofen and Naproxen for 24 hours prior to surgery.     -  Do NOT take these medications in the morning, the day of surgery:     Lisinopril       -  Please take these medications the day of surgery:     Levothyroxine       How do I prepare myself?  -  Take two showers: one the night before surgery; and one the morning of surgery.         Use Scrubcare or Hibiclens to wash from neck down.  You may use your own     shampoo and conditioner. No other hair products.   -  Do NOT use lotion, powder, deodorant, or antiperspirant the day of your surgery.  -  Do NOT wear any makeup, fingernail polish or jewelry  - Do not bring your own medications to the hospital, except for inhalers and eye   drops.  -  Bring your ID and insurance card.    Questions or Concerns:  -If you have questions or concerns regarding the day of surgery, please call [phone].     -If you are scheduled at the [facility] please call [phone].    -For questions after surgery please call your surgeons office.

## 2018-10-25 LAB
ABO + RH BLD: NORMAL
ABO + RH BLD: NORMAL
BLD GP AB SCN SERPL QL: NORMAL
BLOOD BANK CMNT PATIENT-IMP: NORMAL
BLOOD BANK CMNT PATIENT-IMP: NORMAL
SPECIMEN EXP DATE BLD: NORMAL

## 2018-11-01 ENCOUNTER — TELEPHONE (OUTPATIENT)
Dept: ONCOLOGY | Facility: CLINIC | Age: 63
End: 2018-11-01

## 2018-11-07 ENCOUNTER — TELEPHONE (OUTPATIENT)
Dept: ONCOLOGY | Facility: CLINIC | Age: 63
End: 2018-11-07

## 2019-01-14 ENCOUNTER — ANESTHESIA EVENT (OUTPATIENT)
Dept: SURGERY | Facility: CLINIC | Age: 64
End: 2019-01-14
Payer: COMMERCIAL

## 2019-01-15 ENCOUNTER — ANESTHESIA (OUTPATIENT)
Dept: SURGERY | Facility: CLINIC | Age: 64
End: 2019-01-15
Payer: COMMERCIAL

## 2019-01-15 ENCOUNTER — SURGERY (OUTPATIENT)
Age: 64
End: 2019-01-15
Payer: COMMERCIAL

## 2019-01-15 ENCOUNTER — HOSPITAL ENCOUNTER (OUTPATIENT)
Facility: CLINIC | Age: 64
Discharge: HOME | End: 2019-01-15
Attending: OBSTETRICS & GYNECOLOGY | Admitting: OBSTETRICS & GYNECOLOGY
Payer: COMMERCIAL

## 2019-01-15 VITALS
OXYGEN SATURATION: 100 % | WEIGHT: 105.16 LBS | HEART RATE: 71 BPM | TEMPERATURE: 97.1 F | HEIGHT: 61 IN | BODY MASS INDEX: 19.85 KG/M2 | SYSTOLIC BLOOD PRESSURE: 156 MMHG | RESPIRATION RATE: 16 BRPM | DIASTOLIC BLOOD PRESSURE: 90 MMHG

## 2019-01-15 DIAGNOSIS — Z90.721 STATUS POST LEFT OOPHORECTOMY: Primary | ICD-10-CM

## 2019-01-15 DIAGNOSIS — Z80.41 FAMILY HISTORY OF MALIGNANT NEOPLASM OF OVARY: ICD-10-CM

## 2019-01-15 LAB
ABO + RH BLD: NORMAL
ABO + RH BLD: NORMAL
BLD GP AB SCN SERPL QL: NORMAL
BLOOD BANK CMNT PATIENT-IMP: NORMAL
GLUCOSE BLDC GLUCOMTR-MCNC: 94 MG/DL (ref 70–99)
SPECIMEN EXP DATE BLD: NORMAL

## 2019-01-15 PROCEDURE — 36000059 ZZH SURGERY LEVEL 3 EA 15 ADDTL MIN UMMC: Performed by: OBSTETRICS & GYNECOLOGY

## 2019-01-15 PROCEDURE — 88305 TISSUE EXAM BY PATHOLOGIST: CPT | Performed by: OBSTETRICS & GYNECOLOGY

## 2019-01-15 PROCEDURE — 71000027 ZZH RECOVERY PHASE 2 EACH 15 MINS: Performed by: OBSTETRICS & GYNECOLOGY

## 2019-01-15 PROCEDURE — 25000128 H RX IP 250 OP 636: Performed by: OBSTETRICS & GYNECOLOGY

## 2019-01-15 PROCEDURE — 86900 BLOOD TYPING SEROLOGIC ABO: CPT | Performed by: ANESTHESIOLOGY

## 2019-01-15 PROCEDURE — 82962 GLUCOSE BLOOD TEST: CPT

## 2019-01-15 PROCEDURE — 36415 COLL VENOUS BLD VENIPUNCTURE: CPT | Performed by: ANESTHESIOLOGY

## 2019-01-15 PROCEDURE — 25000125 ZZHC RX 250: Performed by: NURSE ANESTHETIST, CERTIFIED REGISTERED

## 2019-01-15 PROCEDURE — 40000170 ZZH STATISTIC PRE-PROCEDURE ASSESSMENT II: Performed by: OBSTETRICS & GYNECOLOGY

## 2019-01-15 PROCEDURE — 88331 PATH CONSLTJ SURG 1 BLK 1SPC: CPT | Performed by: OBSTETRICS & GYNECOLOGY

## 2019-01-15 PROCEDURE — 86850 RBC ANTIBODY SCREEN: CPT | Performed by: ANESTHESIOLOGY

## 2019-01-15 PROCEDURE — 00000155 ZZHCL STATISTIC H-CELL BLOCK W/STAIN: Performed by: OBSTETRICS & GYNECOLOGY

## 2019-01-15 PROCEDURE — 25000132 ZZH RX MED GY IP 250 OP 250 PS 637: Performed by: STUDENT IN AN ORGANIZED HEALTH CARE EDUCATION/TRAINING PROGRAM

## 2019-01-15 PROCEDURE — 88307 TISSUE EXAM BY PATHOLOGIST: CPT | Performed by: OBSTETRICS & GYNECOLOGY

## 2019-01-15 PROCEDURE — 36000057 ZZH SURGERY LEVEL 3 1ST 30 MIN - UMMC: Performed by: OBSTETRICS & GYNECOLOGY

## 2019-01-15 PROCEDURE — 25000128 H RX IP 250 OP 636: Performed by: NURSE ANESTHETIST, CERTIFIED REGISTERED

## 2019-01-15 PROCEDURE — 25000132 ZZH RX MED GY IP 250 OP 250 PS 637: Performed by: ANESTHESIOLOGY

## 2019-01-15 PROCEDURE — 37000008 ZZH ANESTHESIA TECHNICAL FEE, 1ST 30 MIN: Performed by: OBSTETRICS & GYNECOLOGY

## 2019-01-15 PROCEDURE — 25000566 ZZH SEVOFLURANE, EA 15 MIN: Performed by: OBSTETRICS & GYNECOLOGY

## 2019-01-15 PROCEDURE — 71000014 ZZH RECOVERY PHASE 1 LEVEL 2 FIRST HR: Performed by: OBSTETRICS & GYNECOLOGY

## 2019-01-15 PROCEDURE — 37000009 ZZH ANESTHESIA TECHNICAL FEE, EACH ADDTL 15 MIN: Performed by: OBSTETRICS & GYNECOLOGY

## 2019-01-15 PROCEDURE — 86901 BLOOD TYPING SEROLOGIC RH(D): CPT | Performed by: ANESTHESIOLOGY

## 2019-01-15 PROCEDURE — 88112 CYTOPATH CELL ENHANCE TECH: CPT | Performed by: OBSTETRICS & GYNECOLOGY

## 2019-01-15 PROCEDURE — 25000132 ZZH RX MED GY IP 250 OP 250 PS 637: Performed by: OBSTETRICS & GYNECOLOGY

## 2019-01-15 PROCEDURE — 27210794 ZZH OR GENERAL SUPPLY STERILE: Performed by: OBSTETRICS & GYNECOLOGY

## 2019-01-15 PROCEDURE — 58661 LAPAROSCOPY REMOVE ADNEXA: CPT | Mod: GC | Performed by: OBSTETRICS & GYNECOLOGY

## 2019-01-15 PROCEDURE — 25000128 H RX IP 250 OP 636: Performed by: ANESTHESIOLOGY

## 2019-01-15 RX ORDER — CEFAZOLIN SODIUM 1 G/3ML
1 INJECTION, POWDER, FOR SOLUTION INTRAMUSCULAR; INTRAVENOUS SEE ADMIN INSTRUCTIONS
Status: DISCONTINUED | OUTPATIENT
Start: 2019-01-15 | End: 2019-01-15 | Stop reason: HOSPADM

## 2019-01-15 RX ORDER — ONDANSETRON 4 MG/1
4 TABLET, ORALLY DISINTEGRATING ORAL
Status: DISCONTINUED | OUTPATIENT
Start: 2019-01-15 | End: 2019-01-15 | Stop reason: HOSPADM

## 2019-01-15 RX ORDER — LIDOCAINE 40 MG/G
CREAM TOPICAL
Status: DISCONTINUED | OUTPATIENT
Start: 2019-01-15 | End: 2019-01-15 | Stop reason: HOSPADM

## 2019-01-15 RX ORDER — ONDANSETRON 2 MG/ML
4 INJECTION INTRAMUSCULAR; INTRAVENOUS EVERY 30 MIN PRN
Status: DISCONTINUED | OUTPATIENT
Start: 2019-01-15 | End: 2019-01-15 | Stop reason: HOSPADM

## 2019-01-15 RX ORDER — IBUPROFEN 600 MG/1
600 TABLET, FILM COATED ORAL EVERY 6 HOURS PRN
Qty: 30 TABLET | Refills: 0 | Status: SHIPPED | OUTPATIENT
Start: 2019-01-15 | End: 2019-02-14

## 2019-01-15 RX ORDER — AMOXICILLIN 250 MG
1-2 CAPSULE ORAL 2 TIMES DAILY
Qty: 30 TABLET | Refills: 0 | Status: SHIPPED | OUTPATIENT
Start: 2019-01-15 | End: 2019-02-14

## 2019-01-15 RX ORDER — GLYCOPYRROLATE 0.2 MG/ML
INJECTION, SOLUTION INTRAMUSCULAR; INTRAVENOUS PRN
Status: DISCONTINUED | OUTPATIENT
Start: 2019-01-15 | End: 2019-01-15

## 2019-01-15 RX ORDER — FENTANYL CITRATE 50 UG/ML
25-50 INJECTION, SOLUTION INTRAMUSCULAR; INTRAVENOUS
Status: DISCONTINUED | OUTPATIENT
Start: 2019-01-15 | End: 2019-01-15 | Stop reason: HOSPADM

## 2019-01-15 RX ORDER — ACETAMINOPHEN 325 MG/1
650 TABLET ORAL ONCE
Status: COMPLETED | OUTPATIENT
Start: 2019-01-15 | End: 2019-01-15

## 2019-01-15 RX ORDER — ONDANSETRON 4 MG/1
4 TABLET, ORALLY DISINTEGRATING ORAL EVERY 30 MIN PRN
Status: DISCONTINUED | OUTPATIENT
Start: 2019-01-15 | End: 2019-01-15 | Stop reason: HOSPADM

## 2019-01-15 RX ORDER — CEFAZOLIN SODIUM 2 G/100ML
2 INJECTION, SOLUTION INTRAVENOUS
Status: COMPLETED | OUTPATIENT
Start: 2019-01-15 | End: 2019-01-15

## 2019-01-15 RX ORDER — MEPERIDINE HYDROCHLORIDE 50 MG/ML
12.5 INJECTION INTRAMUSCULAR; INTRAVENOUS; SUBCUTANEOUS
Status: DISCONTINUED | OUTPATIENT
Start: 2019-01-15 | End: 2019-01-15 | Stop reason: HOSPADM

## 2019-01-15 RX ORDER — OXYCODONE HYDROCHLORIDE 5 MG/1
5-10 TABLET ORAL EVERY 4 HOURS PRN
Qty: 12 TABLET | Refills: 0 | Status: SHIPPED | OUTPATIENT
Start: 2019-01-15 | End: 2019-01-18

## 2019-01-15 RX ORDER — HEPARIN SODIUM 5000 [USP'U]/.5ML
5000 INJECTION, SOLUTION INTRAVENOUS; SUBCUTANEOUS
Status: COMPLETED | OUTPATIENT
Start: 2019-01-15 | End: 2019-01-15

## 2019-01-15 RX ORDER — DIPHENHYDRAMINE HYDROCHLORIDE 50 MG/ML
INJECTION INTRAMUSCULAR; INTRAVENOUS PRN
Status: DISCONTINUED | OUTPATIENT
Start: 2019-01-15 | End: 2019-01-15

## 2019-01-15 RX ORDER — LIDOCAINE HYDROCHLORIDE 20 MG/ML
INJECTION, SOLUTION INFILTRATION; PERINEURAL PRN
Status: DISCONTINUED | OUTPATIENT
Start: 2019-01-15 | End: 2019-01-15

## 2019-01-15 RX ORDER — LABETALOL HYDROCHLORIDE 5 MG/ML
10 INJECTION, SOLUTION INTRAVENOUS
Status: DISCONTINUED | OUTPATIENT
Start: 2019-01-15 | End: 2019-01-15 | Stop reason: HOSPADM

## 2019-01-15 RX ORDER — OXYCODONE HYDROCHLORIDE 5 MG/1
5 TABLET ORAL
Status: COMPLETED | OUTPATIENT
Start: 2019-01-15 | End: 2019-01-15

## 2019-01-15 RX ORDER — IBUPROFEN 200 MG
600 TABLET ORAL
Status: DISCONTINUED | OUTPATIENT
Start: 2019-01-15 | End: 2019-01-15 | Stop reason: HOSPADM

## 2019-01-15 RX ORDER — OXYCODONE HYDROCHLORIDE 5 MG/1
5 TABLET ORAL EVERY 4 HOURS PRN
Status: DISCONTINUED | OUTPATIENT
Start: 2019-01-15 | End: 2019-01-15 | Stop reason: HOSPADM

## 2019-01-15 RX ORDER — ONDANSETRON 2 MG/ML
INJECTION INTRAMUSCULAR; INTRAVENOUS PRN
Status: DISCONTINUED | OUTPATIENT
Start: 2019-01-15 | End: 2019-01-15

## 2019-01-15 RX ORDER — ACETAMINOPHEN 325 MG/1
650 TABLET ORAL EVERY 4 HOURS PRN
Qty: 30 TABLET | Refills: 0 | Status: SHIPPED | OUTPATIENT
Start: 2019-01-15 | End: 2019-02-14

## 2019-01-15 RX ORDER — EPHEDRINE SULFATE 50 MG/ML
INJECTION, SOLUTION INTRAMUSCULAR; INTRAVENOUS; SUBCUTANEOUS PRN
Status: DISCONTINUED | OUTPATIENT
Start: 2019-01-15 | End: 2019-01-15

## 2019-01-15 RX ORDER — FENTANYL CITRATE 50 UG/ML
INJECTION, SOLUTION INTRAMUSCULAR; INTRAVENOUS PRN
Status: DISCONTINUED | OUTPATIENT
Start: 2019-01-15 | End: 2019-01-15

## 2019-01-15 RX ORDER — PHENAZOPYRIDINE HYDROCHLORIDE 200 MG/1
200 TABLET, FILM COATED ORAL ONCE
Status: COMPLETED | OUTPATIENT
Start: 2019-01-15 | End: 2019-01-15

## 2019-01-15 RX ORDER — NALOXONE HYDROCHLORIDE 0.4 MG/ML
.1-.4 INJECTION, SOLUTION INTRAMUSCULAR; INTRAVENOUS; SUBCUTANEOUS
Status: DISCONTINUED | OUTPATIENT
Start: 2019-01-15 | End: 2019-01-15 | Stop reason: HOSPADM

## 2019-01-15 RX ORDER — PROPOFOL 10 MG/ML
INJECTION, EMULSION INTRAVENOUS PRN
Status: DISCONTINUED | OUTPATIENT
Start: 2019-01-15 | End: 2019-01-15

## 2019-01-15 RX ORDER — SODIUM CHLORIDE, SODIUM LACTATE, POTASSIUM CHLORIDE, CALCIUM CHLORIDE 600; 310; 30; 20 MG/100ML; MG/100ML; MG/100ML; MG/100ML
INJECTION, SOLUTION INTRAVENOUS CONTINUOUS
Status: DISCONTINUED | OUTPATIENT
Start: 2019-01-15 | End: 2019-01-15 | Stop reason: HOSPADM

## 2019-01-15 RX ORDER — HYDRALAZINE HYDROCHLORIDE 20 MG/ML
2.5-5 INJECTION INTRAMUSCULAR; INTRAVENOUS EVERY 10 MIN PRN
Status: DISCONTINUED | OUTPATIENT
Start: 2019-01-15 | End: 2019-01-15 | Stop reason: HOSPADM

## 2019-01-15 RX ORDER — DEXAMETHASONE SODIUM PHOSPHATE 4 MG/ML
INJECTION, SOLUTION INTRA-ARTICULAR; INTRALESIONAL; INTRAMUSCULAR; INTRAVENOUS; SOFT TISSUE PRN
Status: DISCONTINUED | OUTPATIENT
Start: 2019-01-15 | End: 2019-01-15

## 2019-01-15 RX ORDER — HYDROMORPHONE HYDROCHLORIDE 1 MG/ML
.3-.5 INJECTION, SOLUTION INTRAMUSCULAR; INTRAVENOUS; SUBCUTANEOUS EVERY 10 MIN PRN
Status: DISCONTINUED | OUTPATIENT
Start: 2019-01-15 | End: 2019-01-15 | Stop reason: HOSPADM

## 2019-01-15 RX ADMIN — PHENYLEPHRINE HYDROCHLORIDE 100 MCG: 10 INJECTION, SOLUTION INTRAMUSCULAR; INTRAVENOUS; SUBCUTANEOUS at 08:28

## 2019-01-15 RX ADMIN — SODIUM CHLORIDE, POTASSIUM CHLORIDE, SODIUM LACTATE AND CALCIUM CHLORIDE: 600; 310; 30; 20 INJECTION, SOLUTION INTRAVENOUS at 09:47

## 2019-01-15 RX ADMIN — FENTANYL CITRATE 50 MCG: 50 INJECTION, SOLUTION INTRAMUSCULAR; INTRAVENOUS at 08:58

## 2019-01-15 RX ADMIN — PHENYLEPHRINE HYDROCHLORIDE 100 MCG: 10 INJECTION, SOLUTION INTRAMUSCULAR; INTRAVENOUS; SUBCUTANEOUS at 08:24

## 2019-01-15 RX ADMIN — GLYCOPYRROLATE 0.2 MG: 0.2 INJECTION, SOLUTION INTRAMUSCULAR; INTRAVENOUS at 08:33

## 2019-01-15 RX ADMIN — SUGAMMADEX 100 MG: 100 INJECTION, SOLUTION INTRAVENOUS at 10:19

## 2019-01-15 RX ADMIN — Medication 10 MG: at 08:21

## 2019-01-15 RX ADMIN — OXYCODONE HYDROCHLORIDE 5 MG: 5 TABLET ORAL at 10:59

## 2019-01-15 RX ADMIN — FENTANYL CITRATE 50 MCG: 50 INJECTION, SOLUTION INTRAMUSCULAR; INTRAVENOUS at 09:47

## 2019-01-15 RX ADMIN — CEFAZOLIN SODIUM 2 G: 2 INJECTION, SOLUTION INTRAVENOUS at 08:22

## 2019-01-15 RX ADMIN — Medication 10 MG: at 08:26

## 2019-01-15 RX ADMIN — MIDAZOLAM 2 MG: 1 INJECTION INTRAMUSCULAR; INTRAVENOUS at 08:06

## 2019-01-15 RX ADMIN — PHENAZOPYRIDINE HYDROCHLORIDE 200 MG: 200 TABLET, FILM COATED ORAL at 06:32

## 2019-01-15 RX ADMIN — ROCURONIUM BROMIDE 20 MG: 10 INJECTION INTRAVENOUS at 08:47

## 2019-01-15 RX ADMIN — ROCURONIUM BROMIDE 30 MG: 10 INJECTION INTRAVENOUS at 08:11

## 2019-01-15 RX ADMIN — LIDOCAINE HYDROCHLORIDE 100 MG: 20 INJECTION, SOLUTION INFILTRATION; PERINEURAL at 08:10

## 2019-01-15 RX ADMIN — DEXAMETHASONE SODIUM PHOSPHATE 8 MG: 4 INJECTION, SOLUTION INTRA-ARTICULAR; INTRALESIONAL; INTRAMUSCULAR; INTRAVENOUS; SOFT TISSUE at 08:17

## 2019-01-15 RX ADMIN — ROCURONIUM BROMIDE 20 MG: 10 INJECTION INTRAVENOUS at 09:40

## 2019-01-15 RX ADMIN — HEPARIN SODIUM 5000 UNITS: 1000 INJECTION, SOLUTION INTRAVENOUS; SUBCUTANEOUS at 06:42

## 2019-01-15 RX ADMIN — SODIUM CHLORIDE, POTASSIUM CHLORIDE, SODIUM LACTATE AND CALCIUM CHLORIDE: 600; 310; 30; 20 INJECTION, SOLUTION INTRAVENOUS at 08:08

## 2019-01-15 RX ADMIN — SUGAMMADEX 100 MG: 100 INJECTION, SOLUTION INTRAVENOUS at 10:23

## 2019-01-15 RX ADMIN — ONDANSETRON 4 MG: 2 INJECTION INTRAMUSCULAR; INTRAVENOUS at 10:05

## 2019-01-15 RX ADMIN — ACETAMINOPHEN 650 MG: 325 TABLET, FILM COATED ORAL at 06:32

## 2019-01-15 RX ADMIN — PROPOFOL 80 MG: 10 INJECTION, EMULSION INTRAVENOUS at 08:10

## 2019-01-15 RX ADMIN — FENTANYL CITRATE 100 MCG: 50 INJECTION, SOLUTION INTRAMUSCULAR; INTRAVENOUS at 08:10

## 2019-01-15 RX ADMIN — FENTANYL CITRATE 50 MCG: 50 INJECTION, SOLUTION INTRAMUSCULAR; INTRAVENOUS at 09:06

## 2019-01-15 RX ADMIN — DIPHENHYDRAMINE HYDROCHLORIDE 12.5 MG: 50 INJECTION, SOLUTION INTRAMUSCULAR; INTRAVENOUS at 08:36

## 2019-01-15 ASSESSMENT — MIFFLIN-ST. JEOR: SCORE: 959.38

## 2019-01-15 NOTE — PROGRESS NOTES
"Post-Op Check      Dayana Luna is a 63 year old POD#0 s/p Laparoscopic Left Salpingoophorectomy    S: Pt doing well with pain well controlled at this time, has not required pain medication postop. Denies any nausea, shortness of breath and chest pain. She has voided without issue. Ambulating, tolerating light regular diet.     O:    /76 (BP Location: Right arm)   Pulse 72   Temp 97.1  F (36.2  C) (Oral)   Resp 16   Ht 1.549 m (5' 1\")   Wt 47.7 kg (105 lb 2.6 oz)   SpO2 100%   BMI 19.87 kg/m      No intake/output data recorded.    General:  A&Ox3, NAD  CV:  RRR, no m/r/g  Pulm:  CTAB, good inspiratory effort  Abd: soft, appropriately tender to palpation, nondistended.  No rebound or guarding  Incision: c/d/i  Siddiqi:  Lines:  LE: no edema, no calf tenderness    Labs:  No new labs    A/P:  63 year old F, POD#0 s/p Laparoscopic Left Salpingoophorectomy for risk reduction with family history of clear cell ovarian cancer. History of RSO.   1. FEN: Regular diet   2. Pain: home with acetaminophen, ibuprofen, oxycodone  3. CV: No issues. Currently stable.   4. Pulm: IS. No issues.  5. Heme: Hgb 14.3 >EBL 50 mL. No concern for ongoing bleeding at this time.   6. GI: Regular diet as tolerated, PRN bowel regimen for home   7. : s/p Siddiqi, voiding without issue  8. ID: Currently afebrile. Received routine perioperative prophylaxis.   9. Endocrine: No issues.  10. Psych/Neuro: No issues.    11. Prophylaxis: SCDs, PPI, IS, Lovenox am if Hgb stable.  12. Dispo: routine postoperative cares; d/c home when meeting post operative milestones.    Deirdre Tobar MD  OB/GYN PGY-1  01/15/19 12:17 PM               "

## 2019-01-15 NOTE — ANESTHESIA PREPROCEDURE EVALUATION
Anesthesia Evaluation     . Pt has had prior anesthetic. Type: General and MAC    No history of anesthetic complications          ROS/MED HX    ENT/Pulmonary: Comment: Seen early September in the ED for vertigo.  Found to be an inner abnormality.  Seen by ENT.  Since has cleared up.       Neurologic:  - neg neurologic ROS     Cardiovascular:     (+) hypertension----. : . . . :. . No previous cardiac testing       METS/Exercise Tolerance: Comment: Walks for 10 minutes at everyone of her breaks at work.  Kettle bell workout.  >4 METS   Hematologic:  - neg hematologic  ROS       Musculoskeletal:  - neg musculoskeletal ROS       GI/Hepatic:  - neg GI/hepatic ROS       Renal/Genitourinary:  - ROS Renal section negative       Endo: Comment: Known Hashimoto Syndrome being followed.     (+) thyroid problem hypothyroidism, .      Psychiatric:  - neg psychiatric ROS       Infectious Disease:  - neg infectious disease ROS       Malignancy:      - no malignancy   Other:    (+) No chance of pregnancy C-spine cleared: Yes, no H/O Chronic Pain,no other significant disability                    Physical Exam  Normal systems: cardiovascular, pulmonary and dental    Airway   Mallampati: I  TM distance: >3 FB  Neck ROM: full    Dental   (+) missing    Cardiovascular   Rhythm and rate: regular and normal      Pulmonary    breath sounds clear to auscultation    Other findings:     EKG from OSH 9/6/18:  NSR     For further details of assessment, testing, and physical exam please see H and P completed on same date.           PAC Discussion and Assessment    ASA Classification: 1  Case is suitable for: [place]  Anesthetic techniques and relevant risks discussed:   Invasive monitoring and risk discussed:   Types:   Possibility and Risk of blood transfusion discussed:   NPO instructions given:   Additional anesthetic preparation and risks discussed:   Needs early admission to pre-op area:   Other:     PAC Resident/NP Anesthesia  Assessment:  Dayana Luna is a 63 year old female scheduled for left salpingo-oophorectomy with possible hysterectomy with Dr. Hassan with date, time, and location to be determined.  Ms. Luna saw Dr. Hassan on 8/27/2018 for a h/o endometriosis and a strong family history of ovarian cancer (2 sisters with clear cell ovarian cancer). Ms. Luna is status post laparoscopic right salpingo-oophorectomy as well as status post laparoscopic endometrial ablation.  Through the evaluation and imaging, Dr. Hassan recommended the above surgery.    Ms. Luna presents to PAC with her , Dioni.  She denies any cardiopulmonary history or symptoms.  She remains active easily achieving 4 METS. She denies any issues with anesthesia.  She would like to proceed with above surgical intervention.    She has the following specific operative considerations:   - METS:  >4. RCRI : No serious cardiac risks.  0.4 % risk of major adverse cardiac event.  - ADAM # of risks 2/8 = low risk  - VTE risk:  0.5-3%  - Risk of PONV score = 2-3.  If > 2, anti-emetic intervention recommended.    1.  Cardiology: denies cardiopulmonary history or symptoms.  HTN, hold ACE-I DOS. ASA for primary prevention, hold for 7 days prior to surgery.      2.  Pulmonary - no smoking hx  3.  Endocrine - hypothyroidism, followed by endocrine.  Take levothyroxine as prescribed prior to surgery.        - osteopenia, Calcium ad Vit D replacement.  .   4.  Gyn - endometriosis and strong family history of ovarian cancer (2 sisters with clear cell ovarian cancer)>>>above procedure planned.     - Anesthesia considerations:  Refer to PAC assessment in anesthesia records  - CBC, BMP, T& S today     Arrival time, NPO, shower and medication instructions provided by nursing staff today.  Preparing For Your Surgery handout given.  Patient was discussed with Dr Mcdaniel. I spent 30 minutes face to face with patient assessing, educating, counseling and/or coordinating care and  examining the patient.  Of that 30 minutes, I spent greater than 50% of my time counseling and/or coordinating care.        Reviewed and Signed by PAC Mid-Level Provider/Resident  Mid-Level Provider/Resident: Maureen VELAZQUEZ CNP  Date: 10/24/2018  Time: 13:07    Attending Anesthesiologist Anesthesia Assessment:  63 year old for left SO in management of potential for ovarian cancer. Prior right SO and endometrial ablation. Patient has no significant cardiac or pulmonary disease.    Patient/case discussed with JAMAL/resident; agree with above assessment. No need to see patient. Patient is appropriate for the planned procedure without further work-up or medical management.      Reviewed and Signed by PAC Anesthesiologist  Anesthesiologist: deepali  Date: 10.24/2018  Time:   Pass/Fail: Pass  Disposition:     PAC Pharmacist Assessment:        Pharmacist:   Date:   Time:      Anesthesia Plan      History & Physical Review  History and physical reviewed and following examination; no interval change.    ASA Status:  1 .    NPO Status:  > 8 hours    Plan for General and ETT with Intravenous and Propofol induction. Maintenance will be Balanced.    PONV prophylaxis:  Ondansetron (or other 5HT-3) and Dexamethasone or Solumedrol  Additional equipment: 2nd IV      Postoperative Care  Postoperative pain management:  IV analgesics and Oral pain medications.      Consents  Anesthetic plan, risks, benefits and alternatives discussed with:  Patient.  Use of blood products discussed: Yes.   Use of blood products discussed with Patient.  Consented to blood products.  .                          .    MARY [facility] AN PHYSICAL EXAM    Assessment:   ASA SCORE: 1

## 2019-01-15 NOTE — ANESTHESIA POSTPROCEDURE EVALUATION
Anesthesia POST Procedure Evaluation    Patient: Dayana Luna   MRN:     [id number] Gender:   female   Age:    63 year old :      1955        Preoperative Diagnosis: Family History Of Malignant Neoplasm Of Ovary    Procedure(s):  Laparoscopic Left Salpingo-Oophorectomy, 30 minute lysis of adhesions   Postop Comments: No value filed.       Anesthesia Type:  Not documented    Reportable Event: NO     PAIN: Uncomplicated   Sign Out status: Comfortable, Well controlled pain     PONV: No PONV   Sign Out status:  No Nausea or Vomiting     Neuro/Psych: Uneventful perioperative course   Sign Out Status: Preoperative baseline; Age appropriate mentation     Airway/Resp.: Uneventful perioperative course   Sign Out Status: Non labored breathing, age appropriate RR; Resp. Status within EXPECTED Parameters     CV: Uneventful perioperative course   Sign Out status: Appropriate BP and perfusion indices; Appropriate HR/Rhythm     Disposition:   Sign Out in:  PACU  Disposition:  Phase II; Home  Recovery Course: Uneventful  Follow-Up: Not required           Last Anesthesia Record Vitals:  CRNA VITALS  1/15/2019 0959 - 1/15/2019 1059      1/15/2019             Resp Rate (set):  10          Last PACU/Preop Vitals:  Vitals:    01/15/19 1100 01/15/19 1115 01/15/19 1130   BP: 161/87 169/85    Pulse: 72     Resp: 14 16    Temp: 36.4  C (97.6  F)     SpO2: 98% 99% 99%         Electronically Signed By: Teri Bray MD, January 15, 2019, 11:38 AM

## 2019-01-15 NOTE — PROGRESS NOTES
SPIRITUAL HEALTH SERVICES  [facility] ([place])  3C  PRE-SURGERY VISIT    Had pre-surgery visit with Dayana, her  and daughter.  Provided spiritual support, prayer.     Alba Rodriguez MDiv    Pager [phone]

## 2019-01-15 NOTE — OR NURSING
Pt doing exceptionally well. C/O mild pain but very tolerable.. Report to 3C RN .. Dr Shields aware of need for sign out.

## 2019-01-15 NOTE — OP NOTE
Gynecologic Oncology Operative Note   Dayana Luna  [id number]  1/15/2019    Preoperative Diagnosis:   - Family History Of Malignant Neoplasm Of Ovary   - S/p RSO, attempted LSO  - Pelvic adhesions     Postoperative Diagnosis:   - Same, s/p procedure below     Procedure:   Laparoscopic left salpingoophorectomy, 30 minute lysis of adhesions    Surgeon: Jose Hassan MD     Assist: Obdulia Rodriguez MD, Fellow, Yari Jennings MD, S, PGY3    Anesthesia: General endotracheal     Complications: None apparent     EBL: 50 mL   IVF: 1200 mL crystalloid   UOP: 600 mL clear urine     Findings:   On EUA: Atrophic vagina, narrow introitus, adhesions in posterior vaginal vault no clear cervix palpated.    On Laparoscopy: No injury to surrounding tissue on entry. Normal bowel, omentum. Liver with Dith-Hbyj-Mevfcw adhesive disease, normal stomach. Surgically absent right fallopian tube and ovary. Normal uterus. Left fallopian tube and ovary encased in peritoneum. Rectum and sigmoid colon adhered to left pelvic side wall. Lysis of adhesions >30mins. Normal appearing left ovary. Adhesions of posterior uterus to posterior cul-du-sac. Benign left ovary and fallopian tube on frozen.     Indications: Ms. Dayana Luna is a 63 year old female with a family history of clear cell ovarian cancer in two sisters. She initially had undergone lapaorscopy and intended bilateral salpingoophoectomy for risk reduction, however due to adhesive disease only a right salpingoophorectomy was performed. Right ovary pathology was benign. CA-125 was 7.8. She presented for removal of left ovary and fallopian tube. All risks, benefits and alternatives were discussed and written informed consent was obtained.     Procedure: The patient was taken to the operating room where she was placed in the dorsal lithotomy position with feet in yellow fin stirrups. General endotracheal anesthesia was administered. Patient safety time out was then performed. An  exam under anesthesia was performed with findings as above. The patient was then prepped and draped in the usual sterile fashion. Attention was turned to the abdomen. A scalpel was used to make a 15 mm vertical incision superior to the umbilicus and a Woods used to expand the incision and bluntly dissect through the subcutaneous tissue to the fascia. The fascia was grasped with Kocher clamps and tented up. A Hatfield scissors was used to incise the fascia. Two 0-vicryl sutures were placed on either side of the fascia and tagged. The peritoneum was grasped with Rebecca clamps and incised with Metzenbaum scissors. Intraabdominal access was obtained and surrounding area free of adhesions and bowel. A 10 mm port was placed CO2 gas was attached to the port. Pneumoperitoneum was achieved with good tympany of the abdomen. The 10 mm scope was placed in the port and visualized the abdomen which was free of any injury. Attention was turned to the LLQ of the abdomen where a site 4 cm medial to the anterior superior iliac crest was noted to be free of major blood vessels, and a 10 mm horizontal skin incision made. The incision was stretched with a Ramesh. A 5 mm port was placed under visualization within the abdomen. A 5 mm port was placed in the RLQ of the abdomen with similar technique under visualization. Inspection of the pelvis with findings as above. Pelvic washings obtained. Bowel adhesions on left pelvic side wall were taken down carefully with sharp and blunt dissection. The left retroperitoneum was opened by cauterizing the left round ligament. After careful blunt and sharp dissection the left ureter was identified and was away from the operative field. The left ovary and fallopian tube were carefully dissected away from peritoneum using sharp and blunt dissection. The left IP was identified and noted to be far from the left ureter. The IP was clamped, cut, and cauterized using Ligasure device, freeing ovary further from  left pelvic side wall. At this time, removal of left fallopian tube and ovary from uterus commenced. The tube and ovary were removed from the cornua using serial bites of cautery. The left ovary and tube were placed in an Endo catch bad and removed from field and sent to pathology as a frozen specimen. The surgical site was inspected, areas of bleeding were cauterized. Surgical site was irrigated and suctioned dry. No further bleeding was noted.       The ports were removed and pneumoperitoneum was expelled. The umbilical incision fascia was closed with running suture of 0-vicryl. The skin incisions were closed using 3-0 vicryl and Steri-strips and bandages applied. Instrument, sponge, and needle counts were correct times 2. The patient was extubated in the operating room and transferred to the PACU in stable condition.        Jose Hassan MD, MS    Department of Obstetrics and Gynecology   Division of Gynecologic Oncology   [facility]  Phone: [phone]

## 2019-01-15 NOTE — BRIEF OP NOTE
[facility], [place]    Brief Operative Note    Pre-operative diagnosis:   - Family History Of Malignant Neoplasm Of Ovary   - S/p RSO, attempted LSO  - Pelvic adhesions    Post-operative diagnosis   - Same, s/p procedure below    Procedure(s):  Laparoscopic Left Salpingo-Oophorectomy, 30 minute lysis of adhesions  Possible Open Laparotomy, Possible Cancer Staging, Possible Hysterectomy, Possible Debulking     Surgeon(s) and Role:     * Jose Hassan MD - Primary     * Yari Jennings MD - Resident - Assisting     * Obdulia Henry MD - Fellow - Assisting      * Obdulia Henry MD- Fellow     * Yari Jennings MD, [facility]- PGY3    Anesthesia: General  IVF: 1200mL    Estimated blood loss: 50mL  UOP: 600mL  Drains:  None  Specimens: Pelvic washings, left fallopian tube and ovary    Findings:     On EUA: Atrophic vagina, narrow introitus, adhesions in posterior vaginal vault no clear cervix palpated.    On Laparoscopy: No injury to surrounding tissue on entry. Normal bowel, omentum. Liver with Lrzz-Sxxx-Lqakha adhesive disease, normal stomach. Surgically absent right fallopian tube and ovary. Normal uterus. Left fallopian tube and ovary encased in peritoneum. Rectum and sigmoid colon adhered to left pelvic side wall. Lysis of adhesions >30mins. Normal appearing left ovary. Adhesions of posterior uterus to posterior cul-du-sac.    Complications: None apparent  Implants: None.    Yari Jennings MD, S  Ob/Gyn PGY3  01/15/19

## 2019-01-15 NOTE — DISCHARGE INSTRUCTIONS
[facility]  Same-Day Surgery   Adult Discharge Orders & Instructions     For 24 hours after surgery    1. Get plenty of rest.  A responsible adult must stay with you for at least 24 hours after you leave the hospital.   2. Do not drive or use heavy equipment.  If you have weakness or tingling, don't drive or use heavy equipment until this feeling goes away.  3. Do not drink alcohol.  4. Avoid strenuous or risky activities.  Ask for help when climbing stairs.   5. You may feel lightheaded.  IF so, sit for a few minutes before standing.  Have someone help you get up.   6. If you have nausea (feel sick to your stomach): Drink only clear liquids such as apple juice, ginger ale, broth or 7-Up.  Rest may also help.  Be sure to drink enough fluids.  Move to a regular diet as you feel able.  7. You may have a slight fever. Call the doctor if your fever is over 100.5 F (37.7 C) (taken under the tongue) or lasts longer than 24 hours.  8. You may have a dry mouth, a sore throat, muscle aches or trouble sleeping.  These should go away after 24 hours.  9. Do not make important or legal decisions.   Call your doctor for any of the followin.  Signs of infection (fever, growing tenderness at the surgery site, a large amount of drainage or bleeding, severe pain, foul-smelling drainage, redness, swelling).    2. It has been over 8 to 10 hours since surgery and you are still not able to urinate (pass water).    3.  Headache for over 24 hours.      To contact a doctor, call Dr Hassan's office at [phone] or:        [phone] and ask for the resident on call for Gynecology-Oncology (answered 24 hours a day)      Emergency Department:    [facility]: [phone]       (TTY for hearing impaired: [phone])

## 2019-01-16 LAB — COPATH REPORT: NORMAL

## 2019-01-18 LAB — COPATH REPORT: NORMAL

## 2019-01-30 ENCOUNTER — OFFICE VISIT (OUTPATIENT)
Dept: ONCOLOGY | Facility: CLINIC | Age: 64
End: 2019-01-30
Attending: OBSTETRICS & GYNECOLOGY
Payer: COMMERCIAL

## 2019-01-30 VITALS
DIASTOLIC BLOOD PRESSURE: 86 MMHG | SYSTOLIC BLOOD PRESSURE: 154 MMHG | TEMPERATURE: 98.5 F | HEIGHT: 61 IN | HEART RATE: 89 BPM | RESPIRATION RATE: 16 BRPM | WEIGHT: 105.2 LBS | OXYGEN SATURATION: 98 % | BODY MASS INDEX: 19.86 KG/M2

## 2019-01-30 DIAGNOSIS — Z80.41 FAMILY HISTORY OF MALIGNANT NEOPLASM OF OVARY: Primary | ICD-10-CM

## 2019-01-30 PROCEDURE — G0463 HOSPITAL OUTPT CLINIC VISIT: HCPCS | Mod: ZF

## 2019-01-30 PROCEDURE — 99024 POSTOP FOLLOW-UP VISIT: CPT | Mod: ZP | Performed by: OBSTETRICS & GYNECOLOGY

## 2019-01-30 ASSESSMENT — PAIN SCALES - GENERAL: PAINLEVEL: NO PAIN (0)

## 2019-01-30 ASSESSMENT — MIFFLIN-ST. JEOR: SCORE: 959.3

## 2019-01-30 NOTE — NURSING NOTE
"Oncology Rooming Note    January 30, 2019 7:31 AM   Dayana Luna is a 63 year old female who presents for:    Chief Complaint   Patient presents with     Oncology Clinic Visit     Post op Oophorectomy     Initial Vitals: /86 (BP Location: Right arm, Patient Position: Sitting, Cuff Size: Adult Small)   Pulse 89   Temp 98.5  F (36.9  C) (Oral)   Resp 16   Ht 1.549 m (5' 0.98\")   Wt 47.7 kg (105 lb 3.2 oz)   SpO2 98%   BMI 19.89 kg/m   Estimated body mass index is 19.89 kg/m  as calculated from the following:    Height as of this encounter: 1.549 m (5' 0.98\").    Weight as of this encounter: 47.7 kg (105 lb 3.2 oz). Body surface area is 1.43 meters squared.  No Pain (0) Comment: Data Unavailable   No LMP recorded. Patient is postmenopausal.  Allergies reviewed: Yes  Medications reviewed: Yes    Medications: Medication refills not needed today.  Pharmacy name entered into Health Benefits Direct: [id number] - [place], [place] - [facility] [facility]    Clinical concerns: None, Dr Ring was NOT notified.    10 minutes for nursing intake (face to face time)     ANTHONY TELLO LPN            "

## 2019-01-30 NOTE — PROGRESS NOTES
Follow Up Notes on Referred Patient    Date: 10/24/2018       Dr. Lauren Ribeiro, DO  [facility]  [address]  [place] [address]       RE: Dayana Luna  : 1955  TAHIR: 10/24/2018    Dear Dr. Lauren Ribeiro:    Dayana Luna is a 63 year old woman with a family history of clear cell ovarian cancer in her two sisters, who underwent a laparoscopic left salpingo-oophorectomy, lysis of adhesions on .            Patient presents for post-op followup.  No new symptoms since the last time I have seen her.  Pain well controlled, no bowel or bladder complaints.    No malignancy on final pathology.    Past Medical History:  Past Medical History:   Diagnosis Date     Endometriosis      Hypertension      Hypothyroid      Labyrinthitis, acute      Osteopenia          Past Surgical History:  Past Surgical History:   Procedure Laterality Date     LAPAROSCOPIC SALPINGO-OOPHORECTOMY Left 1/15/2019    Procedure: Laparoscopic Left Salpingo-Oophorectomy, 30 minute lysis of adhesions;  Surgeon: Jose Hassan MD;  Location: [facility] OR     SALPINGO OOPHORECTOMY,R/L/MANGO Right 2018         Health Maintenance Due   Topic Date Due     PHQ-2 Q1 YR  1965     HIV SCREEN (SYSTEM ASSIGNED)  1971     HEPATITIS C SCREENING  1971     PAP SCREENING Q3 YR (SYSTEM ASSIGNED)  1974     DTAP/TDAP/TD IMMUNIZATION (1 - Tdap) 1978     LIPID SCREEN Q5 YR FEMALE (SYSTEM ASSIGNED)  1998     ADVANCE DIRECTIVE PLANNING Q5 YRS  2008     ZOSTER IMMUNIZATION (2 of 3) 2016     DEXA SCAN SCREENING (SYSTEM ASSIGNED)  2018       Current Medications:     Current Outpatient Medications   Medication Sig Dispense Refill     Alpha-Lipoic Acid 200 MG CAPS Take 200 mg by mouth every morning        ASPIRIN 81 PO Take 81 mg by mouth every morning ON HOLD FOR SURGERY SINCE 2018       B Complex-C-E-Zn (STRESS B-COMPLEX/VIT C/ZINC) TABS Take 1 tablet by mouth  "every morning        Calcium Carb-Cholecalciferol (CALCIUM 600 + D PO) Take 600 mg by mouth 2 times daily       levothyroxine (SYNTHROID/LEVOTHROID) 88 MCG tablet Take 88 mcg by mouth At Bedtime        lisinopril (PRINIVIL/ZESTRIL) 20 MG tablet Take 20 mg by mouth every morning        Lysine 500 MG CAPS Take 500 mg by mouth every morning        Multiple Vitamins-Minerals (OCUVITE ADULT 50+ PO) Take 1 tablet by mouth every morning        Omega-3 Fatty Acids (FISH OIL PO) Take 1,400 mg by mouth every morning        Ubiquinol 200 MG CAPS Take 1 capsule by mouth every morning        acetaminophen (TYLENOL) 325 MG tablet Take 2 tablets (650 mg) by mouth every 4 hours as needed for mild pain (Patient not taking: Reported on 1/30/2019) 30 tablet 0     ibuprofen (ADVIL/MOTRIN) 600 MG tablet Take 1 tablet (600 mg) by mouth every 6 hours as needed for other (mild and/or inflammatory pain) (Patient not taking: Reported on 1/30/2019) 30 tablet 0     senna-docusate (SENOKOT-S/PERICOLACE) 8.6-50 MG tablet Take 1-2 tablets by mouth 2 times daily (Patient not taking: Reported on 1/30/2019) 30 tablet 0         Allergies:    Allergies   Allergen Reactions     Codeine Hives     Polymyxin B-Trimethoprim Itching and Swelling        Social History:     Social History     Tobacco Use     Smoking status: Never Smoker     Smokeless tobacco: Never Used   Substance Use Topics     Alcohol use: Yes       History   Drug Use No         Family History:   Patient's two sisters have both been diagnosed with clear cell carcinoma of the ovary. Genetic testing in both sisters were non-diagnostic.      Physical Exam:     /86 (BP Location: Right arm, Patient Position: Sitting, Cuff Size: Adult Small)   Pulse 89   Temp 98.5  F (36.9  C) (Oral)   Resp 16   Ht 1.549 m (5' 0.98\")   Wt 47.7 kg (105 lb 3.2 oz)   SpO2 98%   BMI 19.89 kg/m    Body mass index is 19.89 kg/m .    General Appearance: healthy and alert, no distress   "   Neurological:  normal gait, no gross defects     Psychiatric:  appropriate mood and affect             Cardiovascular: Regular rate and rhythm    Pulmonary:  Clear to ascultation bilaterally    Abdomen:  Three laparoscopic incisions well healed, abdomen soft, nontender, no rebound or guarding    Extremities:  normal gait, no gross defects                        1/15/19 Pathology:  FINAL DIAGNOSIS:   OVARY AND FALLOPIAN TUBE, LEFT, SALPINGO-OOPHORECTOMY:   - Benign ovarian tissue with surface adhesions   - Fallopian tube with extensive adhesions   - No atypia or malignancy identified     Assessment:    Dayana Luna is a 63 year old woman with a diagnosis of family history of ovarian cancer, s/p RSO with LSO aborted due to adhesions, now s/p completion LSO.  No evidence of malignancy on final pathology.    A total of 20 minutes was spent with the patient, 15 minutes of which were spent in counseling the patient and/or treatment planning.      1.  Family History of ovarian cancer.   2.  Prior history of endometriosis.   3.  Normal CA-125.      Patient is doing well overall, given benign pathology no additional follow-up through GYN ONC required at this time. Recommend follow-up with a gynecologist annually given her family history.  Please call the clinic with any questions or concerns.      Jose Hassan MD, MS    Department of Obstetrics and Gynecology   Division of Gynecologic Oncology   [facility]  Phone: [phone]        CC  Patient Care Team:  Marta Villavicencio MD as PCP - General (Family Practice)  Lauren Ribeiro DO as Referring Physician  LAUREN RIBEIRO

## 2021-03-03 NOTE — PROGRESS NOTES
Consult Notes on Referred Patient    Date: 2018       Dr. Lauren Ribeiro, DO  [facility]  [address]  [place] [id number]       RE: Dayana Luna  : 1955  TAHIR: 2018    Dear Dr. Lauren Ribeiro:    I had the pleasure of seeing your patient Dayana Luna here at the Gynecologic Cancer Clinic at the [facility] on 2018.  As you know she is a very pleasant 63 year old woman with a recent diagnosis of family history of ovarian cancer.  Given these findings she was subsequently sent to the Gynecologic Cancer Clinic for new patient consultation.   G1, P1, one prior  section.  She went through menopause at age 48.  She has never been on hormone replacement therapy.  No vaginal bleeding.  Both her sisters had been diagnosed with clear cell ovarian cancer, one sister about 2 years ago with a stage IC clear cell carcinoma of the ovaries and undergone adjuvant chemotherapy.  The second sister has been treated here by us with stage IIB clear cell carcinoma of the ovary.  She has undergone, also, adjuvant chemotherapy.  Both also had endometriosis.  Because of that, the patient has recently undergone an attempted laparoscopic hysterectomy due to adhesions.  Only a right salpingo-oophorectomy was performed.  She did have significant endometriosis.  The right tube and ovary were benign.   preoperatively was 8.  There was no ultrasound preoperatively given the patient's discomfort from transvaginal ultrasound.  She now has recovered well from surgery.  She has no nausea or vomiting, fever or chills.  Normal urinary and bowel function.  No vaginal bleeding.  No B symptoms.             Past Medical History:  Hypertension.           Past Surgical History:  1.  Low transverse  section.   2.  Laparoscopy for endometriosis ablation.   3.  Laparoscopic right salpingo-oophorectomy.           Health Maintenance:  Health Maintenance  "Due   Topic Date Due     PHQ-2 Q1 YR  08/17/1965     TETANUS IMMUNIZATION (SYSTEM ASSIGNED)  08/17/1971     HIV SCREEN (SYSTEM ASSIGNED)  08/17/1971     HEPATITIS C SCREENING  08/17/1971     PAP SCREENING Q3 YR (SYSTEM ASSIGNED)  08/17/1974     LIPID SCREEN Q5 YR FEMALE (SYSTEM ASSIGNED)  08/17/1998     MAMMO SCREEN Q2 YR (SYSTEM ASSIGNED)  08/17/2003     COLON CANCER SCREEN (SYSTEM ASSIGNED)  08/17/2003     ADVANCE DIRECTIVE PLANNING Q5 YRS  08/17/2008     PNEUMOCOCCAL (1 of 2 - PCV13) 08/17/2018     FALL RISK ASSESSMENT  08/17/2018     DEXA SCAN SCREENING (SYSTEM ASSIGNED)  08/17/2018     No history of abnormal Pap smears.             Current Medications:     has a current medication list which includes the following prescription(s): iohexol, alpha-lipoic acid, aspirin, lisinopril, and ubiquinol.       Allergies:     [unfilled]        Social History:     Social History   Substance Use Topics     Smoking status: Never Smoker     Smokeless tobacco: Never Used     Alcohol use Yes       History   Drug Use No           Family History:   Clear cell carcinoma of 2 sisters in their 60s of the ovary.           Physical Exam:     /85  Pulse 67  Resp 16  Ht 1.53 m (5' 0.24\")  Wt 46.6 kg (102 lb 11.2 oz)  LMP   SpO2 100%  BMI 19.9 kg/m2  Body mass index is 19.9 kg/(m^2).    General Appearance: healthy and alert, no distress     Musculoskeletal: extremities non tender and without edema    Neurological: normal gait, no gross defects     Psychiatric: appropriate mood and affect                               ABDOMEN:  Soft, nontender, nondistended, no organomegaly.   PELVIC:  Normal external genitalia.  Normal vaginal mucosa.  Normal-appearing cervix.  No adnexal masses or tenderness.  Rectovaginal confirms.             Assessment:    Dayana Luna is a 63 year old woman with a new diagnosis of family history of ovarian cancer.     A total of 60 minutes was spent with the patient, 40 minutes of which were spent in " counseling the patient and/or treatment planning.    1.  Family history of ovarian cancer.   2.  Endometriosis.      Discussed with the patient we will obtain CT of the chest, abdomen and pelvis to evaluate for any anatomic abnormalities.  She had a preoperative CA-125.  It is normal.  I will not repeat this again, as she had recent surgery.  I will see her back after that and will also proceed with at least a left salpingo-oophorectomy.  Given the significance of disease she has, she might even need a hysterectomy at that point, depending on the findings of the CT.  We will have her see my colleagues in Anesthesia at the same time for preoperative clearance.  They do agree with this plan, are very appreciative of her care.  We did discuss that if there is a cancer that typically will run in families.  Sister was tested with 25-gene panel which was negative for germline.  Other sister is still waiting for her testing.  She is part of [facility] program with testing for several hundred genes.  However, we did discuss even if those are negative, which most likely they will be, she is at significant risk just given her family history and the fact that she has endometriosis, which would warrant a prophylactic left salpingo-oophorectomy plus/minus hysterectomy regardless.  Agrees with this plan, is very appreciative of her care.  All questions were answered.             Thank you for allowing us to participate in the care of your patient.         Sincerely,    Jose Hassan MD, MS    Department of Obstetrics and Gynecology   Division of Gynecologic Oncology   [facility]  Phone: [phone]      CC  Patient Care Team:  Eva Zuñiga as PCP - General (Family Practice)  Nacho Ribeiro DO as Referring Physician  NACHO RIBEIRO     0 = swallows foods/liquids without difficulty

## (undated) DEVICE — PREP CHLORAPREP 26ML TINTED ORANGE  260815

## (undated) DEVICE — Device

## (undated) DEVICE — ESU GROUND PAD ADULT W/CORD E7507

## (undated) DEVICE — SOL NACL 0.9% INJ 1000ML BAG 2B1324X

## (undated) DEVICE — DRSG PRIMAPORE 02X3" 7133

## (undated) DEVICE — SU MONOCRYL 3-0 PS-1 27" Y936H

## (undated) DEVICE — SUCTION IRR STRYKERFLOW II W/TIP 250-070-520

## (undated) DEVICE — SOL WATER IRRIG 1000ML BOTTLE 2F7114

## (undated) DEVICE — ESU PENCIL W/COATED BLADE E2450H

## (undated) DEVICE — ENDO POUCH UNIV RETRIEVAL SYSTEM INZII 10MM CD001

## (undated) DEVICE — ENDO TROCAR FIRST ENTRY KII FIOS ADV FIX 05X100MM CFF03

## (undated) DEVICE — ENDO TROCAR SLEEVE KII ADV FIXATION 05X100MM CFS02

## (undated) DEVICE — DRAPE LEGGINGS CLEAR 8430

## (undated) DEVICE — LINEN TOWEL PACK X30 5481

## (undated) DEVICE — LINEN TOWEL PACK X6 WHITE 5487

## (undated) DEVICE — SU VICRYL 0 TIE 54" J608H

## (undated) DEVICE — PROTECTOR ARM ONE-STEP TRENDELENBURG 40418

## (undated) DEVICE — KIT PATIENT POSITIONING PIGAZZI LATEX FREE 40580

## (undated) DEVICE — ESU LIGASURE LAPAROSCOPIC BLUNT TIP SEALER 5MMX37CM LF1837

## (undated) DEVICE — SPECIMEN TRAP MUCOUS 40ML LUKI C30200A

## (undated) DEVICE — SU VICRYL 0 UR-6 27" J603H

## (undated) DEVICE — JELLY LUBRICATING SURGILUBE 2OZ TUBE

## (undated) DEVICE — ESU ENDO SCISSORS 5MM CVD 5DCS

## (undated) DEVICE — SUCTION MANIFOLD DORNOCH ULTRA CART UL-CL500

## (undated) DEVICE — ENDO TROCAR FIRST ENTRY KII FIOS ADV FIX 12X100MM CFF73

## (undated) RX ORDER — HEPARIN SODIUM 5000 [USP'U]/.5ML
INJECTION, SOLUTION INTRAVENOUS; SUBCUTANEOUS
Status: DISPENSED
Start: 2019-01-15

## (undated) RX ORDER — FENTANYL CITRATE 50 UG/ML
INJECTION, SOLUTION INTRAMUSCULAR; INTRAVENOUS
Status: DISPENSED
Start: 2019-01-15

## (undated) RX ORDER — CEFAZOLIN SODIUM 2 G/100ML
INJECTION, SOLUTION INTRAVENOUS
Status: DISPENSED
Start: 2019-01-15

## (undated) RX ORDER — ONDANSETRON 2 MG/ML
INJECTION INTRAMUSCULAR; INTRAVENOUS
Status: DISPENSED
Start: 2019-01-15

## (undated) RX ORDER — PHENAZOPYRIDINE HYDROCHLORIDE 200 MG/1
TABLET, FILM COATED ORAL
Status: DISPENSED
Start: 2019-01-15

## (undated) RX ORDER — PROPOFOL 10 MG/ML
INJECTION, EMULSION INTRAVENOUS
Status: DISPENSED
Start: 2019-01-15

## (undated) RX ORDER — HYDROMORPHONE HYDROCHLORIDE 1 MG/ML
INJECTION, SOLUTION INTRAMUSCULAR; INTRAVENOUS; SUBCUTANEOUS
Status: DISPENSED
Start: 2019-01-15

## (undated) RX ORDER — PHENYLEPHRINE HCL IN 0.9% NACL 1 MG/10 ML
SYRINGE (ML) INTRAVENOUS
Status: DISPENSED
Start: 2019-01-15

## (undated) RX ORDER — LIDOCAINE HYDROCHLORIDE 20 MG/ML
INJECTION, SOLUTION EPIDURAL; INFILTRATION; INTRACAUDAL; PERINEURAL
Status: DISPENSED
Start: 2019-01-15

## (undated) RX ORDER — OXYCODONE HYDROCHLORIDE 5 MG/1
TABLET ORAL
Status: DISPENSED
Start: 2019-01-15

## (undated) RX ORDER — GLYCOPYRROLATE 0.2 MG/ML
INJECTION, SOLUTION INTRAMUSCULAR; INTRAVENOUS
Status: DISPENSED
Start: 2019-01-15

## (undated) RX ORDER — ACETAMINOPHEN 325 MG/1
TABLET ORAL
Status: DISPENSED
Start: 2019-01-15

## (undated) RX ORDER — DEXAMETHASONE SODIUM PHOSPHATE 4 MG/ML
INJECTION, SOLUTION INTRA-ARTICULAR; INTRALESIONAL; INTRAMUSCULAR; INTRAVENOUS; SOFT TISSUE
Status: DISPENSED
Start: 2019-01-15

## (undated) RX ORDER — EPHEDRINE SULFATE 50 MG/ML
INJECTION, SOLUTION INTRAMUSCULAR; INTRAVENOUS; SUBCUTANEOUS
Status: DISPENSED
Start: 2019-01-15